# Patient Record
Sex: FEMALE | Race: BLACK OR AFRICAN AMERICAN | NOT HISPANIC OR LATINO | Employment: FULL TIME | URBAN - METROPOLITAN AREA
[De-identification: names, ages, dates, MRNs, and addresses within clinical notes are randomized per-mention and may not be internally consistent; named-entity substitution may affect disease eponyms.]

---

## 2018-07-18 ENCOUNTER — HISTORICAL (OUTPATIENT)
Dept: LAB | Facility: HOSPITAL | Age: 53
End: 2018-07-18

## 2018-08-20 LAB — FINAL CULTURE: NORMAL

## 2019-07-19 ENCOUNTER — HISTORICAL (OUTPATIENT)
Dept: LAB | Facility: HOSPITAL | Age: 54
End: 2019-07-19

## 2021-08-05 ENCOUNTER — HISTORICAL (OUTPATIENT)
Dept: ADMINISTRATIVE | Facility: HOSPITAL | Age: 56
End: 2021-08-05

## 2022-02-21 LAB
HPV16+18+H RISK 12 DNA CVX-IMP: NEGATIVE
PAP RECOMMENDATION EXT: NORMAL
PAP SMEAR: NORMAL

## 2022-03-14 ENCOUNTER — HISTORICAL (OUTPATIENT)
Dept: RADIOLOGY | Facility: HOSPITAL | Age: 57
End: 2022-03-14

## 2022-03-14 ENCOUNTER — HISTORICAL (OUTPATIENT)
Dept: ADMINISTRATIVE | Facility: HOSPITAL | Age: 57
End: 2022-03-14

## 2022-04-09 ENCOUNTER — HISTORICAL (OUTPATIENT)
Dept: ADMINISTRATIVE | Facility: HOSPITAL | Age: 57
End: 2022-04-09
Payer: COMMERCIAL

## 2022-04-25 VITALS
OXYGEN SATURATION: 98 % | DIASTOLIC BLOOD PRESSURE: 78 MMHG | HEIGHT: 65 IN | BODY MASS INDEX: 27.56 KG/M2 | SYSTOLIC BLOOD PRESSURE: 127 MMHG | WEIGHT: 165.44 LBS

## 2022-08-15 ENCOUNTER — TELEPHONE (OUTPATIENT)
Dept: FAMILY MEDICINE | Facility: CLINIC | Age: 57
End: 2022-08-15
Payer: COMMERCIAL

## 2022-08-15 DIAGNOSIS — E55.9 VITAMIN D DEFICIENCY: ICD-10-CM

## 2022-08-15 DIAGNOSIS — Z00.00 WELLNESS EXAMINATION: Primary | ICD-10-CM

## 2022-08-15 NOTE — TELEPHONE ENCOUNTER
----- Message from Kranthi Trevino MA sent at 8/15/2022  8:42 AM CDT -----  Regarding: FW: Lab orders  Can wellness labs be ordered  ----- Message -----  From: Alyssia López  Sent: 8/15/2022   8:38 AM CDT  To: Ana SKELTON Staff  Subject: Lab orders                                       .Type:  Needs Medical Advice    Who Called: Pt  Symptoms (please be specific):    How long has patient had these symptoms:    Pharmacy name and phone #:    Would the patient rather a call back or a response via [facility]? Call back  Best Call Back Number: [phone]  Additional Information: Needing lab orders, pls call pt once in..

## 2022-08-16 ENCOUNTER — LAB VISIT (OUTPATIENT)
Dept: LAB | Facility: HOSPITAL | Age: 57
End: 2022-08-16
Attending: FAMILY MEDICINE
Payer: COMMERCIAL

## 2022-08-16 DIAGNOSIS — E55.9 VITAMIN D DEFICIENCY: ICD-10-CM

## 2022-08-16 DIAGNOSIS — Z00.00 WELLNESS EXAMINATION: ICD-10-CM

## 2022-08-16 LAB
ALBUMIN SERPL-MCNC: 3.9 GM/DL (ref 3.5–5)
ALBUMIN/GLOB SERPL: 1.5 RATIO (ref 1.1–2)
ALP SERPL-CCNC: 74 UNIT/L (ref 40–150)
ALT SERPL-CCNC: 12 UNIT/L (ref 0–55)
APPEARANCE UR: CLEAR
AST SERPL-CCNC: 15 UNIT/L (ref 5–34)
BACTERIA #/AREA URNS AUTO: NORMAL /HPF
BASOPHILS # BLD AUTO: 0.02 X10(3)/MCL (ref 0–0.2)
BASOPHILS NFR BLD AUTO: 0.4 %
BILIRUB UR QL STRIP.AUTO: NEGATIVE MG/DL
BILIRUBIN DIRECT+TOT PNL SERPL-MCNC: 0.6 MG/DL
BUN SERPL-MCNC: 10 MG/DL (ref 9.8–20.1)
CALCIUM SERPL-MCNC: 9 MG/DL (ref 8.4–10.2)
CHLORIDE SERPL-SCNC: 110 MMOL/L (ref 98–107)
CHOLEST SERPL-MCNC: 178 MG/DL
CHOLEST/HDLC SERPL: 4 {RATIO} (ref 0–5)
CO2 SERPL-SCNC: 27 MMOL/L (ref 22–29)
COLOR UR AUTO: YELLOW
CREAT SERPL-MCNC: 0.88 MG/DL (ref 0.55–1.02)
DEPRECATED CALCIDIOL+CALCIFEROL SERPL-MC: 72.9 NG/ML (ref 30–80)
EOSINOPHIL # BLD AUTO: 0.06 X10(3)/MCL (ref 0–0.9)
EOSINOPHIL NFR BLD AUTO: 1.1 %
ERYTHROCYTE [DISTWIDTH] IN BLOOD BY AUTOMATED COUNT: 14.5 % (ref 11.5–17)
EST. AVERAGE GLUCOSE BLD GHB EST-MCNC: 116.9 MG/DL
GFR SERPLBLD CREATININE-BSD FMLA CKD-EPI: >60 MLS/MIN/1.73/M2
GLOBULIN SER-MCNC: 2.6 GM/DL (ref 2.4–3.5)
GLUCOSE SERPL-MCNC: 94 MG/DL (ref 74–100)
GLUCOSE UR QL STRIP.AUTO: NEGATIVE MG/DL
HBA1C MFR BLD: 5.7 %
HCT VFR BLD AUTO: 36.8 % (ref 37–47)
HDLC SERPL-MCNC: 48 MG/DL (ref 35–60)
HGB BLD-MCNC: 12.1 GM/DL (ref 12–16)
IMM GRANULOCYTES # BLD AUTO: 0.01 X10(3)/MCL (ref 0–0.04)
IMM GRANULOCYTES NFR BLD AUTO: 0.2 %
KETONES UR QL STRIP.AUTO: NEGATIVE MG/DL
LDLC SERPL CALC-MCNC: 119 MG/DL (ref 50–140)
LEUKOCYTE ESTERASE UR QL STRIP.AUTO: NEGATIVE UNIT/L
LYMPHOCYTES # BLD AUTO: 2.12 X10(3)/MCL (ref 0.6–4.6)
LYMPHOCYTES NFR BLD AUTO: 40.5 %
MCH RBC QN AUTO: 27.3 PG (ref 27–31)
MCHC RBC AUTO-ENTMCNC: 32.9 MG/DL (ref 33–36)
MCV RBC AUTO: 82.9 FL (ref 80–94)
MONOCYTES # BLD AUTO: 0.6 X10(3)/MCL (ref 0.1–1.3)
MONOCYTES NFR BLD AUTO: 11.5 %
NEUTROPHILS # BLD AUTO: 2.4 X10(3)/MCL (ref 2.1–9.2)
NEUTROPHILS NFR BLD AUTO: 46.3 %
NITRITE UR QL STRIP.AUTO: NEGATIVE
NRBC BLD AUTO-RTO: 0 %
PH UR STRIP.AUTO: 6 [PH]
PLATELET # BLD AUTO: 235 X10(3)/MCL (ref 130–400)
PMV BLD AUTO: 10.4 FL (ref 7.4–10.4)
POTASSIUM SERPL-SCNC: 4.5 MMOL/L (ref 3.5–5.1)
PROT SERPL-MCNC: 6.5 GM/DL (ref 6.4–8.3)
PROT UR QL STRIP.AUTO: NEGATIVE MG/DL
RBC # BLD AUTO: 4.44 X10(6)/MCL (ref 4.2–5.4)
RBC #/AREA URNS AUTO: <5 /HPF
RBC UR QL AUTO: NEGATIVE UNIT/L
SODIUM SERPL-SCNC: 144 MMOL/L (ref 136–145)
SP GR UR STRIP.AUTO: 1.01 (ref 1–1.03)
SQUAMOUS #/AREA URNS AUTO: <5 /HPF
TRIGL SERPL-MCNC: 57 MG/DL (ref 37–140)
TSH SERPL-ACNC: 1.93 UIU/ML (ref 0.35–4.94)
UROBILINOGEN UR STRIP-ACNC: 0.2 MG/DL
VLDLC SERPL CALC-MCNC: 11 MG/DL
WBC # SPEC AUTO: 5.2 X10(3)/MCL (ref 4.5–11.5)
WBC #/AREA URNS AUTO: <5 /HPF

## 2022-08-16 PROCEDURE — 85025 COMPLETE CBC W/AUTO DIFF WBC: CPT

## 2022-08-16 PROCEDURE — 82306 VITAMIN D 25 HYDROXY: CPT

## 2022-08-16 PROCEDURE — 36415 COLL VENOUS BLD VENIPUNCTURE: CPT

## 2022-08-16 PROCEDURE — 80061 LIPID PANEL: CPT

## 2022-08-16 PROCEDURE — 80053 COMPREHEN METABOLIC PANEL: CPT

## 2022-08-16 PROCEDURE — 83036 HEMOGLOBIN GLYCOSYLATED A1C: CPT

## 2022-08-16 PROCEDURE — 81001 URINALYSIS AUTO W/SCOPE: CPT

## 2022-08-16 PROCEDURE — 84443 ASSAY THYROID STIM HORMONE: CPT

## 2022-08-17 RX ORDER — ALENDRONATE SODIUM 70 MG/1
1 TABLET ORAL
COMMUNITY
Start: 2022-06-06

## 2022-08-23 ENCOUNTER — OFFICE VISIT (OUTPATIENT)
Dept: FAMILY MEDICINE | Facility: CLINIC | Age: 57
End: 2022-08-23
Payer: COMMERCIAL

## 2022-08-23 VITALS
DIASTOLIC BLOOD PRESSURE: 74 MMHG | HEIGHT: 64 IN | OXYGEN SATURATION: 98 % | TEMPERATURE: 98 F | HEART RATE: 74 BPM | BODY MASS INDEX: 27.49 KG/M2 | RESPIRATION RATE: 18 BRPM | SYSTOLIC BLOOD PRESSURE: 118 MMHG | WEIGHT: 161 LBS

## 2022-08-23 DIAGNOSIS — Z00.00 WELLNESS EXAMINATION: Primary | ICD-10-CM

## 2022-08-23 DIAGNOSIS — R73.03 PREDIABETES: ICD-10-CM

## 2022-08-23 DIAGNOSIS — Z12.31 VISIT FOR SCREENING MAMMOGRAM: ICD-10-CM

## 2022-08-23 PROCEDURE — 99396 PR PREVENTIVE VISIT,EST,40-64: ICD-10-PCS | Mod: ,,, | Performed by: FAMILY MEDICINE

## 2022-08-23 PROCEDURE — 1159F MED LIST DOCD IN RCRD: CPT | Mod: CPTII,,, | Performed by: FAMILY MEDICINE

## 2022-08-23 PROCEDURE — 3074F SYST BP LT 130 MM HG: CPT | Mod: CPTII,,, | Performed by: FAMILY MEDICINE

## 2022-08-23 PROCEDURE — 1160F PR REVIEW ALL MEDS BY PRESCRIBER/CLIN PHARMACIST DOCUMENTED: ICD-10-PCS | Mod: CPTII,,, | Performed by: FAMILY MEDICINE

## 2022-08-23 PROCEDURE — 3078F DIAST BP <80 MM HG: CPT | Mod: CPTII,,, | Performed by: FAMILY MEDICINE

## 2022-08-23 PROCEDURE — 3078F PR MOST RECENT DIASTOLIC BLOOD PRESSURE < 80 MM HG: ICD-10-PCS | Mod: CPTII,,, | Performed by: FAMILY MEDICINE

## 2022-08-23 PROCEDURE — 3008F BODY MASS INDEX DOCD: CPT | Mod: CPTII,,, | Performed by: FAMILY MEDICINE

## 2022-08-23 PROCEDURE — 1160F RVW MEDS BY RX/DR IN RCRD: CPT | Mod: CPTII,,, | Performed by: FAMILY MEDICINE

## 2022-08-23 PROCEDURE — 3074F PR MOST RECENT SYSTOLIC BLOOD PRESSURE < 130 MM HG: ICD-10-PCS | Mod: CPTII,,, | Performed by: FAMILY MEDICINE

## 2022-08-23 PROCEDURE — 99396 PREV VISIT EST AGE 40-64: CPT | Mod: ,,, | Performed by: FAMILY MEDICINE

## 2022-08-23 PROCEDURE — 3008F PR BODY MASS INDEX (BMI) DOCUMENTED: ICD-10-PCS | Mod: CPTII,,, | Performed by: FAMILY MEDICINE

## 2022-08-23 PROCEDURE — 1159F PR MEDICATION LIST DOCUMENTED IN MEDICAL RECORD: ICD-10-PCS | Mod: CPTII,,, | Performed by: FAMILY MEDICINE

## 2022-08-23 NOTE — PROGRESS NOTES
Patient ID: [id number]     Chief Complaint: Annual Exam        HPI:     Krystina Bellamy is a 56 y.o. female here today for annual wellness exam.   Well Adult History   The patient presents for well adult exam. The patient's general health status is described as good. The patient's diet is described as balanced. Exercise: occasional. Associated symptoms consist of denies weight loss, denies weight gain, denies fatigue, denies headache, denies hearing loss and denies vision changes. Last menstrual period: Menopause since 46 years old, last DEXA scan: 03/14/2022, osteopenia, on treatment, no side effects, due for repeat DEXA in 03/2024. Additional pertinent history: last dental exam: > 6 months (she have dental insurance, but she has an appointment scheduled for 12/02/2022), last eye exam: 02/2022 (history of macular degeneration, wears eyeglasses, followed by Ophthalmologist- Dr. Negron), last MMG was 03/14/2022, WNL at [facility]), Last pap smear: 02/16/2022, WNL with negative HPV, Last Colonoscopy: 10/16/2014 at [facility], WNL, seat belt use, occasional caffeine use (tea), tobacco use none, no alcohol use. She had labs done on 08/16/2022, here to discuss the results today. She is UTD on vaccines, she has never had chickenpox as a child and doesn't need Shingrix. She refuses COVID-19 vaccine today.  - Patient is without any other complaints today.       ----------------------------  GERD (gastroesophageal reflux disease)  Hyperkalemia  Osteopenia  Vitamin D deficiency     Past Surgical History:   Procedure Laterality Date    TONSILLECTOMY      TUBAL LIGATION         Review of patient's allergies indicates:   Allergen Reactions    Oxycodone-acetaminophen Other (See Comments)     HALUCINATES  Other reaction(s): rash       Outpatient Medications Marked as Taking for the 8/23/22 encounter (Office Visit) with Nohemy Perez MD   Medication Sig Dispense Refill    alendronate (FOSAMAX) 70 MG tablet Take 1 tablet by mouth  "every 7 days.         Social History     Socioeconomic History    Marital status:    Tobacco Use    Smoking status: Never Smoker    Smokeless tobacco: Never Used   Substance and Sexual Activity    Alcohol use: Yes     Comment: SOCIALLY    Drug use: No    Sexual activity: Yes     Partners: Male     Birth control/protection: Surgical        Family History   Problem Relation Age of Onset    Heart failure Mother     Hypertension Mother     Kidney failure Mother     Heart attack Father         Subjective:       Review of Systems:    See HPI for details    Constitutional: No fever, No chills, No fatigue.   Eye: Negative except for HPI; No blurring, No visual disturbances.   Ear/Nose/Mouth/Throat: No decreased hearing, No ear pain, No nasal congestion, No sore throat.   Respiratory: No shortness of breath, No cough, No wheezing.   Cardiovascular: No chest pain, No palpitations, No peripheral edema.   Breast: Both breasts, No lump/ mass, No pain.   Nipple discharge: None.   Gastrointestinal: No nausea, No vomiting, No acid reflux, No diarrhea, No constipation, No abdominal pain.   Genitourinary: No dysuria, No hematuria.   Gynecologic: Negative except as documented in history of present illness.   Hematology/Lymphatics: No bruising tendency, No bleeding tendency, No swollen lymph glands.   Endocrine: No excessive thirst, No polyuria, No excessive hunger.   Musculoskeletal: No joint pain, No muscle pain, No decreased range of motion.   Integumentary: No rash, No pruritus.   Neurologic: No abnormal balance, No confusion, No headache.   Psychiatric: No anxiety, No depression, Not suicidal, No hallucinations.       All Other ROS: Negative except as stated in HPI.       Objective:     /74 (BP Location: Right arm, Patient Position: Sitting, BP Method: Medium (Manual))   Pulse 74   Temp 98.2 °F (36.8 °C) (Oral)   Resp 18   Ht 5' 4" (1.626 m)   Wt 73 kg (161 lb)   SpO2 98%   BMI 27.64 kg/m² "     Physical Exam    General: Alert and oriented, No acute distress.   Appearance: Overweight.   Eye: Pupils are equal, round and reactive to light, Normal conjunctiva.   HENT: Normocephalic, Tympanic membranes are clear, Normal hearing, Oral mucosa is moist, No pharyngeal erythema.   Throat: Pharynx ( Not edematous, No exudate ).   Neck: Supple, Non-tender, No carotid bruit, No lymphadenopathy, No thyromegaly.   Respiratory: Lungs are clear to auscultation, Respirations are non-labored, Breath sounds are equal, Symmetrical chest wall expansion, No chest wall tenderness.   Cardiovascular: Normal rate, Regular rhythm, No murmur, Good pulses equal in all extremities, No edema.   Gastrointestinal: Soft, Non-tender, Non-distended, Normal bowel sounds, No organomegaly, No rebound, No guarding, Negative Espinosa's.   Genitourinary: No costovertebral angle tenderness.   Musculoskeletal: Normal range of motion, No tenderness, Normal gait.   Integumentary: No rashes, No lesions.  Neurologic: No focal deficits, Cranial Nerves II-XII are grossly intact.   Psychiatric: Cooperative, Appropriate mood & affect, Normal judgment, Non-suicidal.   Mood and affect: Calm.   Behavior: Relaxed.     *Lab results from 08/16/2022 were reviewed and discussed with patient and patient voices understanding.*          Assessment:       ICD-10-CM ICD-9-CM   1. Wellness examination  Z00.00 V70.0   2. Prediabetes  R73.03 790.29   3. Visit for screening mammogram  Z12.31 V76.12        Plan:     Problem List Items Addressed This Visit    None     Visit Diagnoses     Wellness examination    -  Primary    Prediabetes        Relevant Orders    Comprehensive Metabolic Panel    Hemoglobin A1C    Visit for screening mammogram        Relevant Orders    Mammo Digital Screening Bilat w/ Delroy       1. Wellness examination  - Monthly breast self exam encouraged. Diet, exercise, and 10% weight loss encouraged. Keep appointment for dental exams x q6 months as  scheduled. Keep appointment for annual eye exam as scheduled. Pap smear in 2025 or sooner if indicated. Notify M.D. or ER if temp greater than 100.4, or any acute illness.   Body mass index is 27.64 kg/m².  Goal BMI <30.  Exercise 5 times a week for 30 minutes per day.  Avoid soda, simple sugars, excessive rice, potatoes or bread. Limit fast foods and fried foods.  Choose complex carbs in moderation (example: green vegetables, beans, oatmeal). Eat plenty of fresh fruits and vegetables with lean meats daily.  Do not skip meals. Eat a balanced portion size.  Avoid fad diets. Consider permanent healthy life style changes.     2. Prediabetes  - Comprehensive Metabolic Panel; Future in 02/2023.  - Hemoglobin A1C; Future in 02/2023.   Lab Results   Component Value Date    HGBA1C 5.7 08/16/2022      Continue   Follow ADA Diet. Avoid soda, simple sweets, and limit rice/pasta/breads/starches.  Maintain healthy weight with goal BMI <25.  Exercise 5 times per week for 30 minutes per day.    3. Visit for screening mammogram  - Mammo Digital Screening Bilat w/ [facility]; Future        Krystina was seen today for annual exam.    Diagnoses and all orders for this visit:    Wellness examination    Prediabetes  -     Comprehensive Metabolic Panel; Future  -     Hemoglobin A1C; Future    Visit for screening mammogram  -     Mammo Digital Screening Bilat w/ [facility]roy; Future          Medication List with Changes/Refills   Current Medications    ALENDRONATE (FOSAMAX) 70 MG TABLET    Take 1 tablet by mouth every 7 days.       Start Date: 6/6/2022  End Date: --          Follow up in about 6 months (around 2/23/2023) for Pre-Diabetes Followup.

## 2023-02-27 ENCOUNTER — LAB VISIT (OUTPATIENT)
Dept: LAB | Facility: HOSPITAL | Age: 58
End: 2023-02-27
Attending: FAMILY MEDICINE
Payer: COMMERCIAL

## 2023-02-27 DIAGNOSIS — R73.03 PREDIABETES: ICD-10-CM

## 2023-02-27 LAB
ALBUMIN SERPL-MCNC: 3.9 G/DL (ref 3.5–5)
ALBUMIN/GLOB SERPL: 1.5 RATIO (ref 1.1–2)
ALP SERPL-CCNC: 97 UNIT/L (ref 40–150)
ALT SERPL-CCNC: 18 UNIT/L (ref 0–55)
AST SERPL-CCNC: 16 UNIT/L (ref 5–34)
BILIRUBIN DIRECT+TOT PNL SERPL-MCNC: 0.4 MG/DL
BUN SERPL-MCNC: 10.5 MG/DL (ref 9.8–20.1)
CALCIUM SERPL-MCNC: 9.3 MG/DL (ref 8.4–10.2)
CHLORIDE SERPL-SCNC: 109 MMOL/L (ref 98–107)
CO2 SERPL-SCNC: 30 MMOL/L (ref 22–29)
CREAT SERPL-MCNC: 1.07 MG/DL (ref 0.55–1.02)
EST. AVERAGE GLUCOSE BLD GHB EST-MCNC: 108.3 MG/DL
GFR SERPLBLD CREATININE-BSD FMLA CKD-EPI: 60 MLS/MIN/1.73/M2
GLOBULIN SER-MCNC: 2.6 GM/DL (ref 2.4–3.5)
GLUCOSE SERPL-MCNC: 103 MG/DL (ref 74–100)
HBA1C MFR BLD: 5.4 %
POTASSIUM SERPL-SCNC: 5 MMOL/L (ref 3.5–5.1)
PROT SERPL-MCNC: 6.5 GM/DL (ref 6.4–8.3)
SODIUM SERPL-SCNC: 145 MMOL/L (ref 136–145)

## 2023-02-27 PROCEDURE — 83036 HEMOGLOBIN GLYCOSYLATED A1C: CPT

## 2023-02-27 PROCEDURE — 80053 COMPREHEN METABOLIC PANEL: CPT

## 2023-02-27 PROCEDURE — 36415 COLL VENOUS BLD VENIPUNCTURE: CPT

## 2023-02-28 ENCOUNTER — OFFICE VISIT (OUTPATIENT)
Dept: FAMILY MEDICINE | Facility: CLINIC | Age: 58
End: 2023-02-28
Payer: COMMERCIAL

## 2023-02-28 VITALS
BODY MASS INDEX: 26.71 KG/M2 | SYSTOLIC BLOOD PRESSURE: 122 MMHG | DIASTOLIC BLOOD PRESSURE: 78 MMHG | WEIGHT: 155.63 LBS | OXYGEN SATURATION: 99 % | RESPIRATION RATE: 16 BRPM | HEART RATE: 74 BPM

## 2023-02-28 DIAGNOSIS — R73.03 PREDIABETES: Primary | ICD-10-CM

## 2023-02-28 DIAGNOSIS — M25.561 CHRONIC PAIN OF RIGHT KNEE: ICD-10-CM

## 2023-02-28 DIAGNOSIS — G89.29 CHRONIC PAIN OF RIGHT KNEE: ICD-10-CM

## 2023-02-28 PROCEDURE — 1159F PR MEDICATION LIST DOCUMENTED IN MEDICAL RECORD: ICD-10-PCS | Mod: CPTII,,, | Performed by: FAMILY MEDICINE

## 2023-02-28 PROCEDURE — 99214 PR OFFICE/OUTPT VISIT, EST, LEVL IV, 30-39 MIN: ICD-10-PCS | Mod: ,,, | Performed by: FAMILY MEDICINE

## 2023-02-28 PROCEDURE — 3074F SYST BP LT 130 MM HG: CPT | Mod: CPTII,,, | Performed by: FAMILY MEDICINE

## 2023-02-28 PROCEDURE — 3008F BODY MASS INDEX DOCD: CPT | Mod: CPTII,,, | Performed by: FAMILY MEDICINE

## 2023-02-28 PROCEDURE — 3074F PR MOST RECENT SYSTOLIC BLOOD PRESSURE < 130 MM HG: ICD-10-PCS | Mod: CPTII,,, | Performed by: FAMILY MEDICINE

## 2023-02-28 PROCEDURE — 3078F PR MOST RECENT DIASTOLIC BLOOD PRESSURE < 80 MM HG: ICD-10-PCS | Mod: CPTII,,, | Performed by: FAMILY MEDICINE

## 2023-02-28 PROCEDURE — 1159F MED LIST DOCD IN RCRD: CPT | Mod: CPTII,,, | Performed by: FAMILY MEDICINE

## 2023-02-28 PROCEDURE — 1160F PR REVIEW ALL MEDS BY PRESCRIBER/CLIN PHARMACIST DOCUMENTED: ICD-10-PCS | Mod: CPTII,,, | Performed by: FAMILY MEDICINE

## 2023-02-28 PROCEDURE — 99214 OFFICE O/P EST MOD 30 MIN: CPT | Mod: ,,, | Performed by: FAMILY MEDICINE

## 2023-02-28 PROCEDURE — 3078F DIAST BP <80 MM HG: CPT | Mod: CPTII,,, | Performed by: FAMILY MEDICINE

## 2023-02-28 PROCEDURE — 3008F PR BODY MASS INDEX (BMI) DOCUMENTED: ICD-10-PCS | Mod: CPTII,,, | Performed by: FAMILY MEDICINE

## 2023-02-28 PROCEDURE — 1160F RVW MEDS BY RX/DR IN RCRD: CPT | Mod: CPTII,,, | Performed by: FAMILY MEDICINE

## 2023-02-28 RX ORDER — DICLOFENAC SODIUM 10 MG/G
2 GEL TOPICAL 4 TIMES DAILY PRN
Qty: 100 G | Refills: 2 | Status: SHIPPED | OUTPATIENT
Start: 2023-02-28

## 2023-02-28 NOTE — PROGRESS NOTES
Patient ID: [id number]     Chief Complaint: Follow-up (Pre-DM)        HPI:     Krystina Bellamy is a 57 y.o. female here today for a follow up pre-diabetes.   - Pre-diabetes is stable and asymptomatic, she reports compliance with lifestyle modifications, lost 6 pounds since last visit with diet and exercise. She had labs done on 02/27/2023, here to discuss the results today.   - Patient complains of right knee pain x 1 year, she denies injury, she reports that the pain is intermittent, 5/10 on pain scale, sharp, worse with walking, she denies swelling or erythema, she reports popping. She hasn't tried any Rx or had any imaging.   - Patient is without any other complaints today.         ----------------------------  GERD (gastroesophageal reflux disease)  Hyperkalemia  Osteopenia  Vitamin D deficiency     Past Surgical History:   Procedure Laterality Date    TONSILLECTOMY      TUBAL LIGATION         Review of patient's allergies indicates:   Allergen Reactions    Oxycodone-acetaminophen Other (See Comments)     HALUCINATES  Other reaction(s): rash       Outpatient Medications Marked as Taking for the 2/28/23 encounter (Office Visit) with Nohemy Perez MD   Medication Sig Dispense Refill    alendronate (FOSAMAX) 70 MG tablet Take 1 tablet by mouth every 7 days.         Social History     Socioeconomic History    Marital status:    Tobacco Use    Smoking status: Never    Smokeless tobacco: Never   Substance and Sexual Activity    Alcohol use: Yes     Comment: SOCIALLY    Drug use: No    Sexual activity: Yes     Partners: Male     Birth control/protection: Surgical        Family History   Problem Relation Age of Onset    Heart failure Mother     Hypertension Mother     Kidney failure Mother     Heart attack Father         Subjective:       Review of Systems:    See HPI for details    Constitutional: Denies Change in appetite. Denies Chills. Denies Fever. Denies Night sweats.  Eye: Denies Blurred vision.  Denies Discharge. Denies Eye pain.  ENT: Denies Decreased hearing. Denies Sore throat. Denies Swollen glands.  Respiratory: Denies Cough. Denies Shortness of breath. Denies Shortness of breath with exertion. Denies Wheezing.  Cardiovascular: Denies Chest pain at rest. Denies Chest pain with exertion. Denies Irregular heartbeat. Denies Palpitations.  Gastrointestinal: Denies Abdominal pain. Denies Diarrhea. Denies Nausea. Denies Vomiting. Denies Hematemesis or Hematochezia.  Genitourinary: Denies Dysuria. Denies Urinary frequency. Denies Urinary urgency. Denies Blood in urine.  Endocrine: Denies Cold intolerance. Denies Excessive thirst. Denies Heat intolerance. Denies Weight loss. Denies Weight gain.  Musculoskeletal: Reports Painful joints. Denies Weakness.  Integumentary: Denies Rash. Denies Itching. Denies Dry skin.  Neurologic: Denies Dizziness. Denies Fainting. Denies Headache.  Psychiatric: Denies Depression. Denies Anxiety. Denies Suicidal/Homicidal ideations.    All Other ROS: Negative except as stated in HPI.       Objective:     /78 (BP Location: Right arm, Patient Position: Sitting, BP Method: Medium (Manual))   Pulse 74   Resp 16   Wt 70.6 kg (155 lb 9.6 oz)   SpO2 99%   BMI 26.71 kg/m²     Physical Exam    General: Alert and oriented, No acute distress.  Head: Normocephalic, Atraumatic.  Eye: Pupils are equal, round and reactive to light, Extraocular movements are intact, Sclera non-icteric.  Ears/Nose/Throat: Normal, Mucosa moist,Clear.  Neck/Thyroid: Supple, Non-tender, No carotid bruit, No palpable thyromegaly or thyroid nodule, No lymphadenopathy, No JVD, Full range of motion.  Respiratory: Clear to auscultation bilaterally; No wheezes, rales or rhonchi,Non-labored respirations, Symmetrical chest wall expansion.  Cardiovascular: Regular rate and rhythm, S1/S2 normal, No murmurs, rubs or gallops.  Gastrointestinal: Soft, Non-tender, Non-distended, Normal bowel sounds, No palpable  organomegaly.  Musculoskeletal: Normal range of motion.  Integumentary: Warm, Dry, Intact, No suspicious lesions or rashes.  Extremities: No clubbing, cyanosis or edema. Right knee with FROM, NT, no erythema, no effusion, no deformity, positive crepitus, negative Lachman's, negative Bereket's.  Neurologic: No focal deficits, Cranial Nerves II-XII are grossly intact, Motor strength normal upper and lower extremities, Sensory exam intact.  Psychiatric: Normal interaction, Coherent speech, Euthymic mood, Appropriate affect     *Lab results from 02/27/2023 and XR right knee from 06/06/2014 were reviewed and discussed with patient and patient voices understanding.*    Assessment:       ICD-10-CM ICD-9-CM   1. Prediabetes  R73.03 790.29   2. Chronic pain of right knee  M25.561 719.46    G89.29 338.29        Plan:     Problem List Items Addressed This Visit    None  Visit Diagnoses       Prediabetes    -  Primary    Chronic pain of right knee        Relevant Medications    diclofenac sodium (VOLTAREN) 1 % Gel    Other Relevant Orders    X-Ray Knee 1 or 2 View Right         1. Prediabetes  - Resolved, continue lifestyle modifications for  prevention of diabetes. Recheck HgA1C with annual wellness labs.  Lab Results   Component Value Date    HGBA1C 5.4 02/27/2023      Continue   Follow ADA Diet. Avoid soda, simple sweets, and limit rice/pasta/breads/starches.  Maintain healthy weight with goal BMI <30.  Exercise 5 times per week for 30 minutes per day.    2. Chronic pain of right knee  - diclofenac sodium (VOLTAREN) 1 % Gel; Apply 2 g topically 4 (four) times daily as needed (pain).  Dispense: 100 g; Refill: 2  - X-Ray Knee 1 or 2 View Right; Future  - Rx trial of Voltaren Gel. Will followup Xray results, if Xray is normal, will proceed with PT referral. Stretching exercises encouraged. Notify M.D. or ER if symptoms persist or worsen, temp >100.4, or any acute illness.        Krystina was seen today for  follow-up.    Diagnoses and all orders for this visit:    Prediabetes    Chronic pain of right knee  -     diclofenac sodium (VOLTAREN) 1 % Gel; Apply 2 g topically 4 (four) times daily as needed (pain).  -     X-Ray Knee 1 or 2 View Right; Future          Medication List with Changes/Refills   New Medications    DICLOFENAC SODIUM (VOLTAREN) 1 % GEL    Apply 2 g topically 4 (four) times daily as needed (pain).       Start Date: 2/28/2023 End Date: --   Current Medications    ALENDRONATE (FOSAMAX) 70 MG TABLET    Take 1 tablet by mouth every 7 days.       Start Date: 6/6/2022  End Date: --          Follow up in about 6 months (around 8/28/2023) for Wellness.

## 2023-03-02 ENCOUNTER — HOSPITAL ENCOUNTER (OUTPATIENT)
Dept: RADIOLOGY | Facility: HOSPITAL | Age: 58
Discharge: HOME | End: 2023-03-02
Attending: FAMILY MEDICINE
Payer: COMMERCIAL

## 2023-03-02 DIAGNOSIS — M25.561 CHRONIC PAIN OF RIGHT KNEE: ICD-10-CM

## 2023-03-02 DIAGNOSIS — G89.29 CHRONIC PAIN OF RIGHT KNEE: ICD-10-CM

## 2023-03-02 PROCEDURE — 73560 X-RAY EXAM OF KNEE 1 OR 2: CPT | Mod: TC,RT

## 2023-03-03 ENCOUNTER — TELEPHONE (OUTPATIENT)
Dept: FAMILY MEDICINE | Facility: CLINIC | Age: 58
End: 2023-03-03
Payer: COMMERCIAL

## 2023-03-03 NOTE — TELEPHONE ENCOUNTER
----- Message from Nohemy Perez MD sent at 3/2/2023 12:04 PM CST -----  XR right knee is normal, please advise if patient would like to proceed with physical therapy referral for evaluation and treatment.

## 2023-03-16 ENCOUNTER — HOSPITAL ENCOUNTER (OUTPATIENT)
Dept: RADIOLOGY | Facility: HOSPITAL | Age: 58
Discharge: HOME | End: 2023-03-16
Attending: FAMILY MEDICINE
Payer: COMMERCIAL

## 2023-03-16 ENCOUNTER — TELEPHONE (OUTPATIENT)
Dept: FAMILY MEDICINE | Facility: CLINIC | Age: 58
End: 2023-03-16
Payer: COMMERCIAL

## 2023-03-16 DIAGNOSIS — Z12.31 VISIT FOR SCREENING MAMMOGRAM: ICD-10-CM

## 2023-03-16 PROCEDURE — 77067 SCR MAMMO BI INCL CAD: CPT | Mod: 26,,, | Performed by: RADIOLOGY

## 2023-03-16 PROCEDURE — 77063 MAMMO DIGITAL SCREENING BILAT WITH TOMO: ICD-10-PCS | Mod: 26,,, | Performed by: RADIOLOGY

## 2023-03-16 PROCEDURE — 77067 SCR MAMMO BI INCL CAD: CPT | Mod: TC

## 2023-03-16 PROCEDURE — 77067 MAMMO DIGITAL SCREENING BILAT WITH TOMO: ICD-10-PCS | Mod: 26,,, | Performed by: RADIOLOGY

## 2023-03-16 PROCEDURE — 77063 BREAST TOMOSYNTHESIS BI: CPT | Mod: 26,,, | Performed by: RADIOLOGY

## 2023-03-16 NOTE — TELEPHONE ENCOUNTER
----- Message from Nohemy Perez MD sent at 3/16/2023  4:53 PM CDT -----  Mammogram is normal, routine mammogram in 03/2024.

## 2023-04-11 ENCOUNTER — DOCUMENTATION ONLY (OUTPATIENT)
Dept: ADMINISTRATIVE | Facility: HOSPITAL | Age: 58
End: 2023-04-11
Payer: COMMERCIAL

## 2023-06-06 ENCOUNTER — OFFICE VISIT (OUTPATIENT)
Dept: FAMILY MEDICINE | Facility: CLINIC | Age: 58
End: 2023-06-06
Payer: COMMERCIAL

## 2023-06-06 VITALS
SYSTOLIC BLOOD PRESSURE: 131 MMHG | OXYGEN SATURATION: 99 % | HEART RATE: 62 BPM | TEMPERATURE: 98 F | WEIGHT: 158 LBS | BODY MASS INDEX: 27.12 KG/M2 | DIASTOLIC BLOOD PRESSURE: 80 MMHG

## 2023-06-06 DIAGNOSIS — J06.9 URI WITH COUGH AND CONGESTION: Primary | ICD-10-CM

## 2023-06-06 DIAGNOSIS — J06.9 URI WITH COUGH AND CONGESTION: ICD-10-CM

## 2023-06-06 LAB
CTP QC/QA: YES
FLUAV AG NPH QL: NEGATIVE
FLUBV AG NPH QL: NEGATIVE
SARS-COV-2 RNA RESP QL NAA+PROBE: NOT DETECTED

## 2023-06-06 PROCEDURE — 1159F MED LIST DOCD IN RCRD: CPT | Mod: CPTII,,, | Performed by: FAMILY MEDICINE

## 2023-06-06 PROCEDURE — 99214 PR OFFICE/OUTPT VISIT, EST, LEVL IV, 30-39 MIN: ICD-10-PCS | Mod: ,,, | Performed by: FAMILY MEDICINE

## 2023-06-06 PROCEDURE — 3079F PR MOST RECENT DIASTOLIC BLOOD PRESSURE 80-89 MM HG: ICD-10-PCS | Mod: CPTII,,, | Performed by: FAMILY MEDICINE

## 2023-06-06 PROCEDURE — 3079F DIAST BP 80-89 MM HG: CPT | Mod: CPTII,,, | Performed by: FAMILY MEDICINE

## 2023-06-06 PROCEDURE — 99214 OFFICE O/P EST MOD 30 MIN: CPT | Mod: ,,, | Performed by: FAMILY MEDICINE

## 2023-06-06 PROCEDURE — 1160F PR REVIEW ALL MEDS BY PRESCRIBER/CLIN PHARMACIST DOCUMENTED: ICD-10-PCS | Mod: CPTII,,, | Performed by: FAMILY MEDICINE

## 2023-06-06 PROCEDURE — 3008F PR BODY MASS INDEX (BMI) DOCUMENTED: ICD-10-PCS | Mod: CPTII,,, | Performed by: FAMILY MEDICINE

## 2023-06-06 PROCEDURE — 3075F SYST BP GE 130 - 139MM HG: CPT | Mod: CPTII,,, | Performed by: FAMILY MEDICINE

## 2023-06-06 PROCEDURE — 1160F RVW MEDS BY RX/DR IN RCRD: CPT | Mod: CPTII,,, | Performed by: FAMILY MEDICINE

## 2023-06-06 PROCEDURE — 87635 SARS-COV-2 COVID-19 AMP PRB: CPT | Performed by: FAMILY MEDICINE

## 2023-06-06 PROCEDURE — 3008F BODY MASS INDEX DOCD: CPT | Mod: CPTII,,, | Performed by: FAMILY MEDICINE

## 2023-06-06 PROCEDURE — 87804 POCT INFLUENZA A/B: ICD-10-PCS | Mod: 59,QW,, | Performed by: FAMILY MEDICINE

## 2023-06-06 PROCEDURE — 3075F PR MOST RECENT SYSTOLIC BLOOD PRESS GE 130-139MM HG: ICD-10-PCS | Mod: CPTII,,, | Performed by: FAMILY MEDICINE

## 2023-06-06 PROCEDURE — 87804 INFLUENZA ASSAY W/OPTIC: CPT | Mod: 59,QW,, | Performed by: FAMILY MEDICINE

## 2023-06-06 PROCEDURE — 1159F PR MEDICATION LIST DOCUMENTED IN MEDICAL RECORD: ICD-10-PCS | Mod: CPTII,,, | Performed by: FAMILY MEDICINE

## 2023-06-06 RX ORDER — ALBUTEROL SULFATE 90 UG/1
2 AEROSOL, METERED RESPIRATORY (INHALATION) EVERY 6 HOURS PRN
Qty: 18 G | Refills: 0 | Status: SHIPPED | OUTPATIENT
Start: 2023-06-06 | End: 2023-06-06

## 2023-06-06 RX ORDER — ALBUTEROL SULFATE 90 UG/1
AEROSOL, METERED RESPIRATORY (INHALATION)
Qty: 18 G | Refills: 0 | Status: SHIPPED | OUTPATIENT
Start: 2023-06-06

## 2023-06-06 RX ORDER — PROMETHAZINE HYDROCHLORIDE AND DEXTROMETHORPHAN HYDROBROMIDE 6.25; 15 MG/5ML; MG/5ML
5 SYRUP ORAL EVERY 6 HOURS PRN
Qty: 120 ML | Refills: 0 | Status: SHIPPED | OUTPATIENT
Start: 2023-06-06

## 2023-06-06 NOTE — PROGRESS NOTES
Patient ID: [id number]     Chief Complaint: Cough (Times 5 days)        HPI:     Krystina Bellamy is a 57 y.o. female here today for cough x 5 days. She reports green sputum production. She reports postnasal drip. She denies fever, chills, sore throat, SOB, wheezing, or hemoptysis. She reports sick contacts at work. She hasn't done at home COVID-19 test. She has tried OTC Mucinex without resolution of symptoms.   - Patient is without any other complaints today.          ----------------------------  GERD (gastroesophageal reflux disease)  Hyperkalemia  Osteopenia  Vitamin D deficiency     Past Surgical History:   Procedure Laterality Date    TONSILLECTOMY      TUBAL LIGATION         Review of patient's allergies indicates:   Allergen Reactions    Oxycodone-acetaminophen Other (See Comments)     HALUCINATES  Other reaction(s): rash       Outpatient Medications Marked as Taking for the 6/6/23 encounter (Office Visit) with Nohemy Perez MD   Medication Sig Dispense Refill    alendronate (FOSAMAX) 70 MG tablet Take 1 tablet by mouth every 7 days.      diclofenac sodium (VOLTAREN) 1 % Gel Apply 2 g topically 4 (four) times daily as needed (pain). 100 g 2       Social History     Socioeconomic History    Marital status:    Tobacco Use    Smoking status: Never    Smokeless tobacco: Never   Substance and Sexual Activity    Alcohol use: Yes     Comment: SOCIALLY    Drug use: No    Sexual activity: Yes     Partners: Male     Birth control/protection: Surgical        Family History   Problem Relation Age of Onset    Heart failure Mother     Hypertension Mother     Kidney failure Mother     Heart attack Father         Subjective:       Review of Systems:    See HPI for details    Constitutional: Denies Change in appetite. Denies Chills. Denies Fever. Denies Night sweats.  Eye: Denies Blurred vision. Denies Discharge. Denies Eye pain.  ENT: Denies Decreased hearing. Denies Sore throat. Denies Swollen  glands.  Respiratory: Reports Cough. Denies Shortness of breath. Denies Shortness of breath with exertion. Denies Wheezing.  Cardiovascular: Denies Chest pain at rest. Denies Chest pain with exertion. Denies Irregular heartbeat. Denies Palpitations.  Gastrointestinal: Denies Abdominal pain. Denies Diarrhea. Denies Nausea. Denies Vomiting. Denies Hematemesis or Hematochezia.  Genitourinary: Denies Dysuria. Denies Urinary frequency. Denies Urinary urgency. Denies Blood in urine.  Endocrine: Denies Cold intolerance. Denies Excessive thirst. Denies Heat intolerance. Denies Weight loss. Denies Weight gain.  Musculoskeletal: Denies Painful joints. Denies Weakness.  Integumentary: Denies Rash. Denies Itching. Denies Dry skin.  Neurologic: Denies Dizziness. Denies Fainting. Denies Headache.  Psychiatric: Denies Depression. Denies Anxiety. Denies Suicidal/Homicidal ideations.    All Other ROS: Negative except as stated in HPI.       Objective:     /80 (BP Location: Right arm, Patient Position: Sitting, BP Method: Large (Automatic))   Pulse 62   Temp 97.9 °F (36.6 °C) (Oral)   Wt 71.7 kg (158 lb)   SpO2 99%   BMI 27.12 kg/m²     Physical Exam    General: Alert and oriented, No acute distress.  Head: Normocephalic, Atraumatic.  Eye: Pupils are equal, round and reactive to light, Extraocular movements are intact, Sclera non-icteric.  Ears/Nose/Throat: Normal, Mucosa moist,Clear.  Neck/Thyroid: Supple, Non-tender, No carotid bruit, No palpable thyromegaly or thyroid nodule, No lymphadenopathy, No JVD, Full range of motion.  Respiratory: Clear to auscultation bilaterally; No wheezes, rales or rhonchi,Non-labored respirations, Symmetrical chest wall expansion. Dry cough.   Cardiovascular: Regular rate and rhythm, S1/S2 normal, No murmurs, rubs or gallops.  Gastrointestinal: Soft, Non-tender, Non-distended, Normal bowel sounds, No palpable organomegaly.  Musculoskeletal: Normal range of motion.  Integumentary: Warm, Dry,  Intact, No suspicious lesions or rashes.  Extremities: No clubbing, cyanosis or edema  Neurologic: No focal deficits, Cranial Nerves II-XII are grossly intact, Motor strength normal upper and lower extremities, Sensory exam intact.  Psychiatric: Normal interaction, Coherent speech, Euthymic mood, Appropriate affect         Assessment:       ICD-10-CM ICD-9-CM   1. URI with cough and congestion  J06.9 465.9        Plan:     Problem List Items Addressed This Visit    None  Visit Diagnoses       URI with cough and congestion    -  Primary    Relevant Medications    promethazine-dextromethorphan (PROMETHAZINE-DM) 6.25-15 mg/5 mL Syrp    Other Relevant Orders    POCT Influenza A/B Rapid Antigen (Completed)    COVID-19 Routine Screening         1. URI with cough and congestion  - Rx trial of promethazine-dextromethorphan (PROMETHAZINE-DM) 6.25-15 mg/5 mL Syrp; Take 5 mLs by mouth every 6 (six) hours as needed (cough).  Dispense: 120 mL; Refill: 0  - Rx trial of albuterol (VENTOLIN HFA) 90 mcg/actuation inhaler; Inhale 2 puffs into the lungs every 6 (six) hours as needed for Wheezing. Rescue  Dispense: 18 g; Refill: 0  - POCT Influenza A/B Rapid Antigen  - COVID-19 Routine Screening; Future  - Will followup COVID-19 test results and patient agrees to self quarantine while awaiting results of COVID-19 testing. If COVID-19 testing is positive, will send Rx for Paxlovid. If COVID-19 testing is negative, will send Rx for Z-claritza. Increase fluid intake. If symptoms are refractory to treatment, will proceed with CXR. Notify M.D. or ER if symptoms persist or worsen, SOB, wheezing, hemoptysis, chest pain, temp >100.4, or any acute illness.        Krystina was seen today for cough.    Diagnoses and all orders for this visit:    URI with cough and congestion  -     promethazine-dextromethorphan (PROMETHAZINE-DM) 6.25-15 mg/5 mL Syrp; Take 5 mLs by mouth every 6 (six) hours as needed (cough).  -     Discontinue: albuterol (VENTOLIN HFA)  90 mcg/actuation inhaler; Inhale 2 puffs into the lungs every 6 (six) hours as needed for Wheezing. Rescue  -     POCT Influenza A/B Rapid Antigen  -     COVID-19 Routine Screening; Future  -     COVID-19 Routine Screening          Medication List with Changes/Refills   New Medications    PROAIR HFA 90 MCG/ACTUATION INHALER    INHALE 2 PUFFS BY MOUTH EVERY 6 HOURS AS NEEDED FOR WHEEZING       Start Date: 6/6/2023  End Date: --    PROMETHAZINE-DEXTROMETHORPHAN (PROMETHAZINE-DM) 6.25-15 MG/5 ML SYRP    Take 5 mLs by mouth every 6 (six) hours as needed (cough).       Start Date: 6/6/2023  End Date: --   Current Medications    ALENDRONATE (FOSAMAX) 70 MG TABLET    Take 1 tablet by mouth every 7 days.       Start Date: 6/6/2022  End Date: --    DICLOFENAC SODIUM (VOLTAREN) 1 % GEL    Apply 2 g topically 4 (four) times daily as needed (pain).       Start Date: 2/28/2023 End Date: --          Follow up if symptoms worsen or fail to improve.

## 2023-06-07 DIAGNOSIS — J06.9 URI WITH COUGH AND CONGESTION: Primary | ICD-10-CM

## 2023-06-07 RX ORDER — AZITHROMYCIN 250 MG/1
TABLET, FILM COATED ORAL
Qty: 6 TABLET | Refills: 0 | Status: SHIPPED | OUTPATIENT
Start: 2023-06-07 | End: 2023-06-12

## 2023-06-07 NOTE — TELEPHONE ENCOUNTER
----- Message from Nohemy Perez MD sent at 6/7/2023 12:51 PM CDT -----  COVID-19 testing is negative, Rx Z-claritza sent for URI.

## 2023-08-21 ENCOUNTER — TELEPHONE (OUTPATIENT)
Dept: FAMILY MEDICINE | Facility: CLINIC | Age: 58
End: 2023-08-21
Payer: COMMERCIAL

## 2023-08-21 DIAGNOSIS — Z00.00 WELLNESS EXAMINATION: Primary | ICD-10-CM

## 2023-08-21 NOTE — TELEPHONE ENCOUNTER
----- Message from April Stewart sent at 8/21/2023  9:03 AM CDT -----  Regarding: lab order  .Caller is requesting to schedule their Lab appointment prior to annual appointment.  Order is not listed in EPIC.  Please enter order and contact patient to schedule.    Name of Caller:patient    Preferred Date and Time of Labs: 8/22    Date of EPP Appointment: 8/24    Where would they like the lab performed?    Would the patient rather a call back or a response via [facility]? Call back    Best Call Back Number: [phone]    Additional Information: patient is requesting her labs be placed in epic for her wellness appointment on 8/24. Please advise.

## 2023-08-22 ENCOUNTER — LAB VISIT (OUTPATIENT)
Dept: LAB | Facility: HOSPITAL | Age: 58
End: 2023-08-22
Attending: FAMILY MEDICINE
Payer: COMMERCIAL

## 2023-08-22 DIAGNOSIS — Z00.00 WELLNESS EXAMINATION: ICD-10-CM

## 2023-08-22 LAB
ALBUMIN SERPL-MCNC: 4 G/DL (ref 3.5–5)
ALBUMIN/GLOB SERPL: 1.5 RATIO (ref 1.1–2)
ALP SERPL-CCNC: 110 UNIT/L (ref 40–150)
ALT SERPL-CCNC: 19 UNIT/L (ref 0–55)
APPEARANCE UR: CLEAR
AST SERPL-CCNC: 18 UNIT/L (ref 5–34)
BACTERIA #/AREA URNS AUTO: NORMAL /HPF
BASOPHILS # BLD AUTO: 0.03 X10(3)/MCL
BASOPHILS NFR BLD AUTO: 0.4 %
BILIRUB SERPL-MCNC: 0.4 MG/DL
BILIRUB UR QL STRIP.AUTO: NEGATIVE
BUN SERPL-MCNC: 15.3 MG/DL (ref 9.8–20.1)
CALCIUM SERPL-MCNC: 9.5 MG/DL (ref 8.4–10.2)
CHLORIDE SERPL-SCNC: 109 MMOL/L (ref 98–107)
CHOLEST SERPL-MCNC: 215 MG/DL
CHOLEST/HDLC SERPL: 4 {RATIO} (ref 0–5)
CO2 SERPL-SCNC: 27 MMOL/L (ref 22–29)
COLOR UR: YELLOW
CREAT SERPL-MCNC: 0.88 MG/DL (ref 0.55–1.02)
EOSINOPHIL # BLD AUTO: 0.1 X10(3)/MCL (ref 0–0.9)
EOSINOPHIL NFR BLD AUTO: 1.5 %
ERYTHROCYTE [DISTWIDTH] IN BLOOD BY AUTOMATED COUNT: 14.6 % (ref 11.5–17)
EST. AVERAGE GLUCOSE BLD GHB EST-MCNC: 111.2 MG/DL
GFR SERPLBLD CREATININE-BSD FMLA CKD-EPI: >60 MLS/MIN/1.73/M2
GLOBULIN SER-MCNC: 2.6 GM/DL (ref 2.4–3.5)
GLUCOSE SERPL-MCNC: 96 MG/DL (ref 74–100)
GLUCOSE UR QL STRIP.AUTO: NEGATIVE
HBA1C MFR BLD: 5.5 %
HCT VFR BLD AUTO: 37.2 % (ref 37–47)
HDLC SERPL-MCNC: 60 MG/DL (ref 35–60)
HGB BLD-MCNC: 12.5 G/DL (ref 12–16)
IMM GRANULOCYTES # BLD AUTO: 0.01 X10(3)/MCL (ref 0–0.04)
IMM GRANULOCYTES NFR BLD AUTO: 0.1 %
KETONES UR QL STRIP.AUTO: NEGATIVE
LDLC SERPL CALC-MCNC: 144 MG/DL (ref 50–140)
LEUKOCYTE ESTERASE UR QL STRIP.AUTO: ABNORMAL
LYMPHOCYTES # BLD AUTO: 2.53 X10(3)/MCL (ref 0.6–4.6)
LYMPHOCYTES NFR BLD AUTO: 37.1 %
MCH RBC QN AUTO: 27.6 PG (ref 27–31)
MCHC RBC AUTO-ENTMCNC: 33.6 G/DL (ref 33–36)
MCV RBC AUTO: 82.1 FL (ref 80–94)
MONOCYTES # BLD AUTO: 0.56 X10(3)/MCL (ref 0.1–1.3)
MONOCYTES NFR BLD AUTO: 8.2 %
NEUTROPHILS # BLD AUTO: 3.59 X10(3)/MCL (ref 2.1–9.2)
NEUTROPHILS NFR BLD AUTO: 52.7 %
NITRITE UR QL STRIP.AUTO: NEGATIVE
NRBC BLD AUTO-RTO: 0 %
PH UR STRIP.AUTO: 6 [PH]
PLATELET # BLD AUTO: 226 X10(3)/MCL (ref 130–400)
PMV BLD AUTO: 10.6 FL (ref 7.4–10.4)
POTASSIUM SERPL-SCNC: 4.6 MMOL/L (ref 3.5–5.1)
PROT SERPL-MCNC: 6.6 GM/DL (ref 6.4–8.3)
PROT UR QL STRIP.AUTO: NEGATIVE
RBC # BLD AUTO: 4.53 X10(6)/MCL (ref 4.2–5.4)
RBC #/AREA URNS AUTO: <5 /HPF
RBC UR QL AUTO: NEGATIVE
SODIUM SERPL-SCNC: 142 MMOL/L (ref 136–145)
SP GR UR STRIP.AUTO: 1.01 (ref 1–1.03)
SQUAMOUS #/AREA URNS AUTO: <5 /HPF
TRIGL SERPL-MCNC: 56 MG/DL (ref 37–140)
TSH SERPL-ACNC: 2.39 UIU/ML (ref 0.35–4.94)
UROBILINOGEN UR STRIP-ACNC: 0.2
VLDLC SERPL CALC-MCNC: 11 MG/DL
WBC # SPEC AUTO: 6.82 X10(3)/MCL (ref 4.5–11.5)
WBC #/AREA URNS AUTO: <5 /HPF

## 2023-08-22 PROCEDURE — 84443 ASSAY THYROID STIM HORMONE: CPT

## 2023-08-22 PROCEDURE — 80061 LIPID PANEL: CPT

## 2023-08-22 PROCEDURE — 36415 COLL VENOUS BLD VENIPUNCTURE: CPT

## 2023-08-22 PROCEDURE — 81001 URINALYSIS AUTO W/SCOPE: CPT

## 2023-08-22 PROCEDURE — 83036 HEMOGLOBIN GLYCOSYLATED A1C: CPT

## 2023-08-22 PROCEDURE — 85025 COMPLETE CBC W/AUTO DIFF WBC: CPT

## 2023-08-22 PROCEDURE — 80053 COMPREHEN METABOLIC PANEL: CPT

## 2023-08-24 ENCOUNTER — OFFICE VISIT (OUTPATIENT)
Dept: FAMILY MEDICINE | Facility: CLINIC | Age: 58
End: 2023-08-24
Payer: COMMERCIAL

## 2023-08-24 VITALS
RESPIRATION RATE: 17 BRPM | BODY MASS INDEX: 27.62 KG/M2 | DIASTOLIC BLOOD PRESSURE: 81 MMHG | OXYGEN SATURATION: 98 % | HEART RATE: 57 BPM | WEIGHT: 160.88 LBS | SYSTOLIC BLOOD PRESSURE: 126 MMHG

## 2023-08-24 DIAGNOSIS — Z00.00 WELLNESS EXAMINATION: Primary | ICD-10-CM

## 2023-08-24 DIAGNOSIS — Z13.820 SCREENING FOR OSTEOPOROSIS: ICD-10-CM

## 2023-08-24 DIAGNOSIS — Z78.0 POSTMENOPAUSAL: ICD-10-CM

## 2023-08-24 DIAGNOSIS — Z12.31 VISIT FOR SCREENING MAMMOGRAM: ICD-10-CM

## 2023-08-24 DIAGNOSIS — E78.2 MIXED HYPERLIPIDEMIA: ICD-10-CM

## 2023-08-24 PROCEDURE — 99396 PREV VISIT EST AGE 40-64: CPT | Mod: ,,, | Performed by: FAMILY MEDICINE

## 2023-08-24 PROCEDURE — 1159F MED LIST DOCD IN RCRD: CPT | Mod: CPTII,,, | Performed by: FAMILY MEDICINE

## 2023-08-24 PROCEDURE — 1160F PR REVIEW ALL MEDS BY PRESCRIBER/CLIN PHARMACIST DOCUMENTED: ICD-10-PCS | Mod: CPTII,,, | Performed by: FAMILY MEDICINE

## 2023-08-24 PROCEDURE — 3074F SYST BP LT 130 MM HG: CPT | Mod: CPTII,,, | Performed by: FAMILY MEDICINE

## 2023-08-24 PROCEDURE — 3044F HG A1C LEVEL LT 7.0%: CPT | Mod: CPTII,,, | Performed by: FAMILY MEDICINE

## 2023-08-24 PROCEDURE — 99396 PR PREVENTIVE VISIT,EST,40-64: ICD-10-PCS | Mod: ,,, | Performed by: FAMILY MEDICINE

## 2023-08-24 PROCEDURE — 3079F PR MOST RECENT DIASTOLIC BLOOD PRESSURE 80-89 MM HG: ICD-10-PCS | Mod: CPTII,,, | Performed by: FAMILY MEDICINE

## 2023-08-24 PROCEDURE — 3074F PR MOST RECENT SYSTOLIC BLOOD PRESSURE < 130 MM HG: ICD-10-PCS | Mod: CPTII,,, | Performed by: FAMILY MEDICINE

## 2023-08-24 PROCEDURE — 3079F DIAST BP 80-89 MM HG: CPT | Mod: CPTII,,, | Performed by: FAMILY MEDICINE

## 2023-08-24 PROCEDURE — 3044F PR MOST RECENT HEMOGLOBIN A1C LEVEL <7.0%: ICD-10-PCS | Mod: CPTII,,, | Performed by: FAMILY MEDICINE

## 2023-08-24 PROCEDURE — 3008F BODY MASS INDEX DOCD: CPT | Mod: CPTII,,, | Performed by: FAMILY MEDICINE

## 2023-08-24 PROCEDURE — 1160F RVW MEDS BY RX/DR IN RCRD: CPT | Mod: CPTII,,, | Performed by: FAMILY MEDICINE

## 2023-08-24 PROCEDURE — 3008F PR BODY MASS INDEX (BMI) DOCUMENTED: ICD-10-PCS | Mod: CPTII,,, | Performed by: FAMILY MEDICINE

## 2023-08-24 PROCEDURE — 1159F PR MEDICATION LIST DOCUMENTED IN MEDICAL RECORD: ICD-10-PCS | Mod: CPTII,,, | Performed by: FAMILY MEDICINE

## 2023-08-24 NOTE — PROGRESS NOTES
Patient ID: [id number]     Chief Complaint: Annual Exam        HPI:     Krystina Bellamy is a 57 y.o. female here today for annual wellness exam.  Well Adult History   The patient presents for well adult exam. The patient's general health status is described as good. The patient's diet is described as balanced. Exercise: occasional. Associated symptoms consist of denies weight loss, denies weight gain, denies fatigue, denies headache, denies hearing loss and denies vision changes. Last menstrual period: Menopause since 46 years old, last DEXA scan: 03/14/2022, osteopenia, on treatment, no side effects, due for repeat DEXA in 03/2024. Additional pertinent history: last dental exam: goes annually, last eye exam: 02/2023 (history of macular degeneration, wears eyeglasses, followed by [facility]), last MMG was 03/16/2023, WNL at [facility]), Last pap smear: 02/16/2022, WNL with negative HPV, Last Colonoscopy: 10/16/2014 at [facility], WNL, due in 11/2024, seat belt use, occasional caffeine use (tea), tobacco use none, social alcohol use. She had labs done on 08/22/2023, here to discuss the results today. She refuses HIV and Hep C screening. She is UTD on vaccines, she has never had chickenpox as a child and doesn't need Shingrix. She refuses COVID-19 vaccine today.  - Patient is without any other complaints today.     Advance Care Planning     Date: 08/24/2023  Patient did not wish or was not able to name a surrogate decision maker or provide an Advance Care Plan.          ----------------------------  GERD (gastroesophageal reflux disease)  Hyperkalemia  Osteopenia  Vitamin D deficiency     Past Surgical History:   Procedure Laterality Date    TONSILLECTOMY      TUBAL LIGATION         Review of patient's allergies indicates:   Allergen Reactions    Oxycodone-acetaminophen Other (See Comments)     HALUCINATES  Other reaction(s): rash       Outpatient Medications Marked as Taking for the 8/24/23 encounter (Office Visit) with Ana  Nohemy SKELTON MD   Medication Sig Dispense Refill    alendronate (FOSAMAX) 70 MG tablet Take 1 tablet by mouth every 7 days.      diclofenac sodium (VOLTAREN) 1 % Gel Apply 2 g topically 4 (four) times daily as needed (pain). 100 g 2    PROAIR HFA 90 mcg/actuation inhaler INHALE 2 PUFFS BY MOUTH EVERY 6 HOURS AS NEEDED FOR WHEEZING 18 g 0    promethazine-dextromethorphan (PROMETHAZINE-DM) 6.25-15 mg/5 mL Syrp Take 5 mLs by mouth every 6 (six) hours as needed (cough). 120 mL 0       Social History     Socioeconomic History    Marital status:    Tobacco Use    Smoking status: Never    Smokeless tobacco: Never   Substance and Sexual Activity    Alcohol use: Yes     Comment: SOCIALLY    Drug use: No    Sexual activity: Yes     Partners: Male     Birth control/protection: Surgical        Family History   Problem Relation Age of Onset    Heart failure Mother     Hypertension Mother     Kidney failure Mother     Heart attack Father         Subjective:       Review of Systems:    See HPI for details    Constitutional: No fever, No chills, No fatigue.   Eye: Negative except for HPI; No blurring, No visual disturbances.   Ear/Nose/Mouth/Throat: No decreased hearing, No ear pain, No nasal congestion, No sore throat.   Respiratory: No shortness of breath, No cough, No wheezing.   Cardiovascular: No chest pain, No palpitations, No peripheral edema.   Breast: Both breasts, No lump/ mass, No pain.   Nipple discharge: None.   Gastrointestinal: No nausea, No vomiting, No acid reflux, No diarrhea, No constipation, No abdominal pain.   Genitourinary: No dysuria, No hematuria.   Gynecologic: Negative except as documented in history of present illness.   Hematology/Lymphatics: No bruising tendency, No bleeding tendency, No swollen lymph glands.   Endocrine: No excessive thirst, No polyuria, No excessive hunger.   Musculoskeletal: No joint pain, No muscle pain, No decreased range of motion.   Integumentary: No rash, No pruritus.    Neurologic: No abnormal balance, No confusion, No headache.   Psychiatric: No anxiety, No depression, Not suicidal, No hallucinations.     All Other ROS: Negative except as stated in HPI.       Objective:     /81 (BP Location: Right arm, Patient Position: Sitting, BP Method: Large (Automatic))   Pulse (!) 57   Resp 17   Wt 73 kg (160 lb 14.4 oz)   SpO2 98%   BMI 27.62 kg/m²     Physical Exam    General: Alert and oriented, No acute distress.   Appearance: Overweight.   Eye: Pupils are equal, round and reactive to light, Normal conjunctiva.   HENT: Normocephalic, Tympanic membranes are clear, Normal hearing, Oral mucosa is moist, No pharyngeal erythema.   Throat: Pharynx ( Not edematous, No exudate ).   Neck: Supple, Non-tender, No carotid bruit, No lymphadenopathy, No thyromegaly.   Respiratory: Lungs are clear to auscultation, Respirations are non-labored, Breath sounds are equal, Symmetrical chest wall expansion, No chest wall tenderness.   Cardiovascular: Normal rate, Regular rhythm, No murmur, Good pulses equal in all extremities, No edema.   Gastrointestinal: Soft, Non-tender, Non-distended, Normal bowel sounds, No organomegaly, No rebound, No guarding, Negative Espinosa's.   Genitourinary: No costovertebral angle tenderness.   Musculoskeletal: Normal range of motion, No tenderness, Normal gait.   Integumentary: No rashes, No lesions.  Neurologic: No focal deficits, Cranial Nerves II-XII are grossly intact.   Psychiatric: Cooperative, Appropriate mood & affect, Normal judgment, Non-suicidal.   Mood and affect: Calm.   Behavior: Relaxed.     *Lab results from 08/22/2023 were reviewed and discussed with patient and patient voices understanding.*    The 10-year ASCVD risk score (Sue DK, et al., 2019) is: 4.6%    Values used to calculate the score:      Age: 57 years      Sex: Female      Is Non- : Yes      Diabetic: No      Tobacco smoker: No      Systolic Blood Pressure: 126  mmHg      Is BP treated: No      HDL Cholesterol: 60 mg/dL      Total Cholesterol: 215 mg/dL       Assessment:       ICD-10-CM ICD-9-CM   1. Wellness examination  Z00.00 V70.0   2. Visit for screening mammogram  Z12.31 V76.12   3. Postmenopausal  Z78.0 V49.81   4. Screening for osteoporosis  Z13.820 V82.81   5. Mixed hyperlipidemia  E78.2 272.2        Plan:     Problem List Items Addressed This Visit          Cardiac/Vascular    Mixed hyperlipidemia     Other Visit Diagnoses       Wellness examination    -  Primary    Relevant Orders    CBC Auto Differential    Comprehensive Metabolic Panel    Hemoglobin A1C    Lipid Panel    TSH    Urinalysis, Reflex to Urine Culture    Visit for screening mammogram        Relevant Orders    Mammo Digital Screening Bilat w/ Delroy    Postmenopausal        Relevant Orders    DXA Bone Density Axial Skeleton 1 or more sites    Screening for osteoporosis        Relevant Orders    DXA Bone Density Axial Skeleton 1 or more sites         1. Wellness examination  - CBC Auto Differential; Future in 08/2024  - Comprehensive Metabolic Panel; Future in 08/2024  - Hemoglobin A1C; Future in 08/2024  - Lipid Panel; Future in 08/2024  - TSH; Future in 08/2024  - Urinalysis, Reflex to Urine Culture; Future in 08/2024  - Monthly breast self exam encouraged. Diet, exercise, and 10% weight loss encouraged. Keep appointment for dental exams x q6 months as scheduled. Keep appointment for annual eye exam as scheduled. Notify M.D. or ER if temp greater than 100.4, or any acute illness.      2. Visit for screening mammogram  - Mammo Digital Screening Bilat w/ Delroy; Future    3. Postmenopausal  - DXA Bone Density Axial Skeleton 1 or more sites; Future    4. Screening for osteoporosis  - DXA Bone Density Axial Skeleton 1 or more sites; Future    5. Mixed hyperlipidemia  - Cholesterol is elevated, but ASCVD risk score is low, needs low cholesterol diet and OTC omega-3/fish acids as directed, recheck FLP in  08/2024.  Continue  Stressed importance of dietary modifications. Follow a low cholesterol, low saturated fat diet with less that 200mg of cholesterol a day.  Avoid fried foods and high saturated fats (high saturated fats less than 7% of calories).  Add Flax Seed/Fish Oil supplements to diet. Increase dietary fiber.  Regular exercise can reduce LDL and raise HDL. Stressed importance of physical activity 5 times per week for 30 minutes per day.        Krystina was seen today for annual exam.    Diagnoses and all orders for this visit:    Wellness examination  -     CBC Auto Differential; Future  -     Comprehensive Metabolic Panel; Future  -     Hemoglobin A1C; Future  -     Lipid Panel; Future  -     TSH; Future  -     Urinalysis, Reflex to Urine Culture; Future    Visit for screening mammogram  -     Mammo Digital Screening Bilat w/ Delroy; Future    Postmenopausal  -     DXA Bone Density Axial Skeleton 1 or more sites; Future    Screening for osteoporosis  -     DXA Bone Density Axial Skeleton 1 or more sites; Future    Mixed hyperlipidemia          Medication List with Changes/Refills   Current Medications    ALENDRONATE (FOSAMAX) 70 MG TABLET    Take 1 tablet by mouth every 7 days.       Start Date: 6/6/2022  End Date: --    DICLOFENAC SODIUM (VOLTAREN) 1 % GEL    Apply 2 g topically 4 (four) times daily as needed (pain).       Start Date: 2/28/2023 End Date: --    PROAIR HFA 90 MCG/ACTUATION INHALER    INHALE 2 PUFFS BY MOUTH EVERY 6 HOURS AS NEEDED FOR WHEEZING       Start Date: 6/6/2023  End Date: --    PROMETHAZINE-DEXTROMETHORPHAN (PROMETHAZINE-DM) 6.25-15 MG/5 ML SYRP    Take 5 mLs by mouth every 6 (six) hours as needed (cough).       Start Date: 6/6/2023  End Date: --          Follow up in about 1 year (around 8/24/2024) for Wellness.

## 2023-10-02 ENCOUNTER — HOSPITAL ENCOUNTER (EMERGENCY)
Facility: HOSPITAL | Age: 58
Discharge: HOME | End: 2023-10-02
Attending: STUDENT IN AN ORGANIZED HEALTH CARE EDUCATION/TRAINING PROGRAM
Payer: COMMERCIAL

## 2023-10-02 VITALS
RESPIRATION RATE: 16 BRPM | BODY MASS INDEX: 29.62 KG/M2 | OXYGEN SATURATION: 99 % | TEMPERATURE: 98 F | WEIGHT: 160.94 LBS | SYSTOLIC BLOOD PRESSURE: 143 MMHG | DIASTOLIC BLOOD PRESSURE: 88 MMHG | HEART RATE: 78 BPM | HEIGHT: 62 IN

## 2023-10-02 DIAGNOSIS — M54.42 ACUTE LEFT-SIDED LOW BACK PAIN WITH LEFT-SIDED SCIATICA: Primary | ICD-10-CM

## 2023-10-02 PROCEDURE — 99284 EMERGENCY DEPT VISIT MOD MDM: CPT

## 2023-10-02 PROCEDURE — 25000003 PHARM REV CODE 250: Performed by: NURSE PRACTITIONER

## 2023-10-02 RX ORDER — BACLOFEN 10 MG/1
10 TABLET ORAL 3 TIMES DAILY PRN
Qty: 21 TABLET | Refills: 0 | Status: SHIPPED | OUTPATIENT
Start: 2023-10-02

## 2023-10-02 RX ORDER — KETOROLAC TROMETHAMINE 10 MG/1
10 TABLET, FILM COATED ORAL
Status: COMPLETED | OUTPATIENT
Start: 2023-10-02 | End: 2023-10-02

## 2023-10-02 RX ORDER — INDOMETHACIN 25 MG/1
25 CAPSULE ORAL 2 TIMES DAILY PRN
Qty: 14 CAPSULE | Refills: 0 | Status: SHIPPED | OUTPATIENT
Start: 2023-10-02

## 2023-10-02 RX ADMIN — KETOROLAC TROMETHAMINE 10 MG: 10 TABLET, FILM COATED ORAL at 09:10

## 2023-10-02 NOTE — ED PROVIDER NOTES
Encounter Date: 10/2/2023       History     Chief Complaint   Patient presents with    Back Pain     Left lower back pain radiating down left leg for years but worse over the past week. Pain worse with walking and bending over.      Pt is a 57 y.o. female who presents to the [facility] ED complaining of lower back pain. Reports pain radiations into her Lt leg. Pt currently employed at a local [employer] and admits to chronic pain to area. Uncertain if injury to back but admits the issue seems to have worsened over the last few days. Denies loss of bowel or bladder control, chest pain, SOB, weakness, dizziness, or fever.      Review of patient's allergies indicates:   Allergen Reactions    Oxycodone-acetaminophen Other (See Comments)     HALUCINATES  Other reaction(s): rash     Past Medical History:   Diagnosis Date    GERD (gastroesophageal reflux disease)     Hyperkalemia     Osteopenia     Vitamin D deficiency      Past Surgical History:   Procedure Laterality Date    TONSILLECTOMY      TUBAL LIGATION       Family History   Problem Relation Age of Onset    Heart failure Mother     Hypertension Mother     Kidney failure Mother     Heart attack Father      Social History     Tobacco Use    Smoking status: Never    Smokeless tobacco: Never   Substance Use Topics    Alcohol use: Yes     Comment: SOCIALLY    Drug use: No     Review of Systems   Constitutional:  Negative for chills, diaphoresis, fatigue and fever.   HENT:  Negative for facial swelling, rhinorrhea, sinus pressure, sinus pain, sore throat and trouble swallowing.    Respiratory:  Negative for cough, chest tightness, shortness of breath and wheezing.    Cardiovascular:  Negative for chest pain, palpitations and leg swelling.   Gastrointestinal:  Negative for abdominal pain, diarrhea, nausea and vomiting.   Genitourinary:  Negative for dysuria, flank pain, frequency, hematuria and urgency.   Musculoskeletal:  Positive for arthralgias and back pain. Negative for  joint swelling and myalgias.   Skin:  Negative for color change and rash.   Neurological:  Negative for dizziness, syncope, weakness and light-headedness.   Hematological:  Does not bruise/bleed easily.   All other systems reviewed and are negative.      Physical Exam     Initial Vitals [10/02/23 0915]   BP Pulse Resp Temp SpO2   (!) 163/81 80 20 97.5 °F (36.4 °C) 98 %      MAP       --         Physical Exam    Nursing note and vitals reviewed.  Constitutional: She appears well-developed and well-nourished.   HENT:   Head: Normocephalic and atraumatic.   Nose: Nose normal.   Mouth/Throat: Oropharynx is clear and moist.   Eyes: Conjunctivae and EOM are normal. Pupils are equal, round, and reactive to light.   Neck: Neck supple.   Normal range of motion.  Cardiovascular:  Normal rate, regular rhythm, normal heart sounds and intact distal pulses.           Pulmonary/Chest: Effort normal and breath sounds normal. No respiratory distress. She has no wheezes. She has no rhonchi. She has no rales. She exhibits no tenderness.   Abdominal: Abdomen is soft and flat. Bowel sounds are normal. She exhibits no distension. There is no abdominal tenderness. There is no rebound, no guarding, no tenderness at McBurney's point and negative Espinosa's sign. negative psoas sign  Musculoskeletal:         General: Normal range of motion.      Cervical back: Normal range of motion and neck supple.      Lumbar back: Spasms and tenderness present. No swelling, edema, deformity or signs of trauma.        Back:      Neurological: She is alert and oriented to person, place, and time. She has normal strength and normal reflexes.   Skin: Skin is warm and dry. Capillary refill takes less than 2 seconds.   Psychiatric: She has a normal mood and affect. Her speech is normal and behavior is normal. Judgment and thought content normal.         ED Course   Procedures  Labs Reviewed - No data to display       Imaging Results              X-Ray Lumbar  Spine Ap And Lateral (Final result)  Result time 10/02/23 10:21:31      Final result by Mae Lopez MD (10/02/23 10:21:31)                   Impression:      1. No acute abnormality identified.  2. Multilevel degenerative changes of the lumbar spine.      Electronically signed by: Mae Lopez  Date:    10/02/2023  Time:    10:21               Narrative:    EXAMINATION:  XR LUMBAR SPINE AP AND LATERAL    CLINICAL HISTORY:  low back pain left sciatica;    COMPARISON:  X-rays dated 03/26/2015    FINDINGS:  There are 5 non-rib-bearing lumbar type vertebral bodies.  Alignment is normal.  The vertebral body heights and disc spaces are maintained.  There are small multilevel marginal osteophytes.  Mild facet arthropathy is present in the lower lumbar spine.  The soft tissues are unremarkable.                                       Medications   ketorolac tablet 10 mg (10 mg Oral Given 10/2/23 0939)     Medical Decision Making  Differential:  Low back strain  DDD  Fracture  Sciatica    Amount and/or Complexity of Data Reviewed  Radiology: ordered.    Risk  Prescription drug management.               ED Course as of 10/02/23 1035   Mon Oct 02, 2023   1034 10:34 AM Reassessed patient at this time. Reports condition has improved. Discussed with patient all pertinent ED information and results. Discussed diagnosis and treatment plan with patient. Follow up instructions and return to ED instruction have been given. All questions and concerns were addressed at this time. Patient voices understanding of information and instructions. Patient is comfortable with plan and discharge. Patient is stable for discharge.    [JA]      ED Course User Index  [JA] Sang Matthews Jr., FNP                    Clinical Impression:   Final diagnoses:  [M54.42] Acute left-sided low back pain with left-sided sciatica (Primary)        ED Disposition Condition    Discharge Stable          ED Prescriptions       Medication Sig Dispense  Start Date End Date Auth. Provider    indomethacin (INDOCIN) 25 MG capsule Take 1 capsule (25 mg total) by mouth 2 (two) times daily as needed (pain). 14 capsule 10/2/2023 -- Sang Matthews Jr., FNP    baclofen (LIORESAL) 10 MG tablet Take 1 tablet (10 mg total) by mouth 3 (three) times daily as needed (muscle spasms). 21 tablet 10/2/2023 -- Sang Matthews Jr., FNP          Follow-up Information       Follow up With Specialties Details Why Contact Info    Nohemy Perez MD Family Medicine In 3 days  [address] [facility]  [address]  [facility] [id number] [phone]      [facility] Emergency Medicine In 3 days As needed, If symptoms worsen [address] [facility] [address] [phone]             Sang Matthews Jr., FNP  10/02/23 2156

## 2024-03-22 ENCOUNTER — HOSPITAL ENCOUNTER (OUTPATIENT)
Dept: RADIOLOGY | Facility: HOSPITAL | Age: 59
Discharge: HOME | End: 2024-03-22
Attending: FAMILY MEDICINE
Payer: COMMERCIAL

## 2024-03-22 DIAGNOSIS — Z78.0 POSTMENOPAUSAL: ICD-10-CM

## 2024-03-22 DIAGNOSIS — Z13.820 SCREENING FOR OSTEOPOROSIS: ICD-10-CM

## 2024-03-22 DIAGNOSIS — Z12.31 VISIT FOR SCREENING MAMMOGRAM: ICD-10-CM

## 2024-03-22 PROCEDURE — 77063 BREAST TOMOSYNTHESIS BI: CPT | Mod: 26,,, | Performed by: RADIOLOGY

## 2024-03-22 PROCEDURE — 77067 SCR MAMMO BI INCL CAD: CPT | Mod: TC

## 2024-03-22 PROCEDURE — 77080 DXA BONE DENSITY AXIAL: CPT | Mod: TC

## 2024-03-22 PROCEDURE — 77080 DXA BONE DENSITY AXIAL: CPT | Mod: 26,,, | Performed by: RADIOLOGY

## 2024-03-22 PROCEDURE — 77067 SCR MAMMO BI INCL CAD: CPT | Mod: 26,,, | Performed by: RADIOLOGY

## 2024-03-25 ENCOUNTER — TELEPHONE (OUTPATIENT)
Dept: FAMILY MEDICINE | Facility: CLINIC | Age: 59
End: 2024-03-25
Payer: COMMERCIAL

## 2024-03-25 NOTE — TELEPHONE ENCOUNTER
----- Message from Nohemy Perez MD sent at 3/25/2024  2:39 PM CDT -----  Mammogram shows benign findings, no mammographic evidence of malignancy, routine Mammogram in 03/2025.

## 2024-03-25 NOTE — TELEPHONE ENCOUNTER
----- Message from Nohemy Perez MD sent at 3/25/2024  6:40 AM CDT -----  DEXA scan shows osteopenia (thinning of her bones) of both of her femurs and increases her risk of a femur fracture, no osteoporosis, continue Fosamax and OTC calcium plus Vitamin D supplement as directed to help strengthen her bones. Will need to repeat DEXA scan in 03/2026.

## 2024-08-20 ENCOUNTER — TELEPHONE (OUTPATIENT)
Dept: FAMILY MEDICINE | Facility: CLINIC | Age: 59
End: 2024-08-20
Payer: COMMERCIAL

## 2024-08-20 NOTE — TELEPHONE ENCOUNTER
Are there any outstanding tasks in the patients's chart (ex.labs,MM,etc)?  yes  Do we have outstanding/pending referrals?  no  Has the patient been seen in and ER,UCC, or been admitted since last visit?  yes  Has the patient seen any other health care provider(doctors) since last visit?  no  Has the patient had any bloodwork or x-rays done since last visit?  no

## 2024-08-22 ENCOUNTER — LAB VISIT (OUTPATIENT)
Dept: LAB | Facility: HOSPITAL | Age: 59
End: 2024-08-22
Attending: FAMILY MEDICINE
Payer: COMMERCIAL

## 2024-08-22 DIAGNOSIS — Z00.00 WELLNESS EXAMINATION: ICD-10-CM

## 2024-08-22 LAB
ALBUMIN SERPL-MCNC: 3.8 G/DL (ref 3.4–4.8)
ALBUMIN/GLOB SERPL: 1.5 RATIO (ref 1.1–2)
ALP SERPL-CCNC: 103 UNIT/L (ref 40–150)
ALT SERPL-CCNC: 9 UNIT/L (ref 0–55)
ANION GAP SERPL CALC-SCNC: 8 MEQ/L
AST SERPL-CCNC: 14 UNIT/L (ref 5–34)
BACTERIA #/AREA URNS AUTO: ABNORMAL /HPF
BASOPHILS # BLD AUTO: 0.04 X10(3)/MCL
BASOPHILS NFR BLD AUTO: 0.7 %
BILIRUB SERPL-MCNC: 0.4 MG/DL
BILIRUB UR QL STRIP.AUTO: NEGATIVE
BUN SERPL-MCNC: 12.7 MG/DL (ref 9.8–20.1)
CALCIUM SERPL-MCNC: 8.8 MG/DL (ref 8.4–10.2)
CHLORIDE SERPL-SCNC: 109 MMOL/L (ref 98–107)
CHOLEST SERPL-MCNC: 200 MG/DL
CHOLEST/HDLC SERPL: 3 {RATIO} (ref 0–5)
CLARITY UR: CLEAR
CO2 SERPL-SCNC: 24 MMOL/L (ref 23–31)
COLOR UR AUTO: COLORLESS
CREAT SERPL-MCNC: 0.88 MG/DL (ref 0.55–1.02)
CREAT/UREA NIT SERPL: 14
EOSINOPHIL # BLD AUTO: 0.12 X10(3)/MCL (ref 0–0.9)
EOSINOPHIL NFR BLD AUTO: 2.1 %
ERYTHROCYTE [DISTWIDTH] IN BLOOD BY AUTOMATED COUNT: 14.7 % (ref 11.5–17)
EST. AVERAGE GLUCOSE BLD GHB EST-MCNC: 114 MG/DL
GFR SERPLBLD CREATININE-BSD FMLA CKD-EPI: >60 ML/MIN/1.73/M2
GLOBULIN SER-MCNC: 2.5 GM/DL (ref 2.4–3.5)
GLUCOSE SERPL-MCNC: 99 MG/DL (ref 82–115)
GLUCOSE UR QL STRIP: NORMAL
HBA1C MFR BLD: 5.6 %
HCT VFR BLD AUTO: 38.4 % (ref 37–47)
HDLC SERPL-MCNC: 59 MG/DL (ref 35–60)
HGB BLD-MCNC: 12.4 G/DL (ref 12–16)
HGB UR QL STRIP: NEGATIVE
IMM GRANULOCYTES # BLD AUTO: 0 X10(3)/MCL (ref 0–0.04)
IMM GRANULOCYTES NFR BLD AUTO: 0 %
KETONES UR QL STRIP: NEGATIVE
LDLC SERPL CALC-MCNC: 127 MG/DL (ref 50–140)
LEUKOCYTE ESTERASE UR QL STRIP: NEGATIVE
LYMPHOCYTES # BLD AUTO: 2.4 X10(3)/MCL (ref 0.6–4.6)
LYMPHOCYTES NFR BLD AUTO: 42 %
MCH RBC QN AUTO: 28.1 PG (ref 27–31)
MCHC RBC AUTO-ENTMCNC: 32.3 G/DL (ref 33–36)
MCV RBC AUTO: 87.1 FL (ref 80–94)
MONOCYTES # BLD AUTO: 0.54 X10(3)/MCL (ref 0.1–1.3)
MONOCYTES NFR BLD AUTO: 9.5 %
MUCOUS THREADS URNS QL MICRO: ABNORMAL /LPF
NEUTROPHILS # BLD AUTO: 2.61 X10(3)/MCL (ref 2.1–9.2)
NEUTROPHILS NFR BLD AUTO: 45.7 %
NITRITE UR QL STRIP: NEGATIVE
NRBC BLD AUTO-RTO: 0 %
PH UR STRIP: 5.5 [PH]
PLATELET # BLD AUTO: 229 X10(3)/MCL (ref 130–400)
PMV BLD AUTO: 10.7 FL (ref 7.4–10.4)
POTASSIUM SERPL-SCNC: 4.1 MMOL/L (ref 3.5–5.1)
PROT SERPL-MCNC: 6.3 GM/DL (ref 5.8–7.6)
PROT UR QL STRIP: NEGATIVE
RBC # BLD AUTO: 4.41 X10(6)/MCL (ref 4.2–5.4)
RBC #/AREA URNS AUTO: ABNORMAL /HPF
SODIUM SERPL-SCNC: 141 MMOL/L (ref 136–145)
SP GR UR STRIP.AUTO: 1.01 (ref 1–1.03)
SQUAMOUS #/AREA URNS LPF: ABNORMAL /HPF
TRIGL SERPL-MCNC: 69 MG/DL (ref 37–140)
TSH SERPL-ACNC: 1.72 UIU/ML (ref 0.35–4.94)
UROBILINOGEN UR STRIP-ACNC: NORMAL
VLDLC SERPL CALC-MCNC: 14 MG/DL
WBC # BLD AUTO: 5.71 X10(3)/MCL (ref 4.5–11.5)
WBC #/AREA URNS AUTO: ABNORMAL /HPF

## 2024-08-22 PROCEDURE — 85025 COMPLETE CBC W/AUTO DIFF WBC: CPT

## 2024-08-22 PROCEDURE — 84443 ASSAY THYROID STIM HORMONE: CPT

## 2024-08-22 PROCEDURE — 80061 LIPID PANEL: CPT

## 2024-08-22 PROCEDURE — 80053 COMPREHEN METABOLIC PANEL: CPT

## 2024-08-22 PROCEDURE — 81015 MICROSCOPIC EXAM OF URINE: CPT

## 2024-08-22 PROCEDURE — 83036 HEMOGLOBIN GLYCOSYLATED A1C: CPT

## 2024-08-22 PROCEDURE — 81001 URINALYSIS AUTO W/SCOPE: CPT

## 2024-08-22 PROCEDURE — 36415 COLL VENOUS BLD VENIPUNCTURE: CPT

## 2024-08-26 ENCOUNTER — OFFICE VISIT (OUTPATIENT)
Dept: FAMILY MEDICINE | Facility: CLINIC | Age: 59
End: 2024-08-26
Payer: COMMERCIAL

## 2024-08-26 VITALS
HEIGHT: 62 IN | BODY MASS INDEX: 28.71 KG/M2 | OXYGEN SATURATION: 99 % | HEART RATE: 74 BPM | WEIGHT: 156 LBS | SYSTOLIC BLOOD PRESSURE: 125 MMHG | RESPIRATION RATE: 16 BRPM | DIASTOLIC BLOOD PRESSURE: 80 MMHG

## 2024-08-26 DIAGNOSIS — Z00.00 WELLNESS EXAMINATION: Primary | ICD-10-CM

## 2024-08-26 DIAGNOSIS — Z12.31 VISIT FOR SCREENING MAMMOGRAM: ICD-10-CM

## 2024-08-26 DIAGNOSIS — Z12.11 SCREENING FOR COLON CANCER: ICD-10-CM

## 2024-08-26 PROCEDURE — 3044F HG A1C LEVEL LT 7.0%: CPT | Mod: CPTII,,, | Performed by: FAMILY MEDICINE

## 2024-08-26 PROCEDURE — 1159F MED LIST DOCD IN RCRD: CPT | Mod: CPTII,,, | Performed by: FAMILY MEDICINE

## 2024-08-26 PROCEDURE — 3008F BODY MASS INDEX DOCD: CPT | Mod: CPTII,,, | Performed by: FAMILY MEDICINE

## 2024-08-26 PROCEDURE — 1160F RVW MEDS BY RX/DR IN RCRD: CPT | Mod: CPTII,,, | Performed by: FAMILY MEDICINE

## 2024-08-26 PROCEDURE — 99396 PREV VISIT EST AGE 40-64: CPT | Mod: ,,, | Performed by: FAMILY MEDICINE

## 2024-08-26 PROCEDURE — 3079F DIAST BP 80-89 MM HG: CPT | Mod: CPTII,,, | Performed by: FAMILY MEDICINE

## 2024-08-26 PROCEDURE — 3074F SYST BP LT 130 MM HG: CPT | Mod: CPTII,,, | Performed by: FAMILY MEDICINE

## 2024-08-26 NOTE — PATIENT INSTRUCTIONS
Jimbo Flaherty,     If you are due for any health screening(s) below please notify me so we can arrange them to be ordered and scheduled. Most healthy patients at your age complete them, but you are free to accept or refuse.     If you can't do it, I'll definitely understand. If you can, I'd certainly appreciate it!    All of your core healthy metrics are met.

## 2024-08-26 NOTE — PROGRESS NOTES
Patient ID: [id number]     Chief Complaint: Annual Exam        HPI:     Krystina Bellamy is a 58 y.o. female here today for annual wellness exam.   Well Adult History   The patient presents for well adult exam. The patient's general health status is described as good. The patient's diet is described as balanced. Exercise: occasional. Associated symptoms consist of denies weight loss, denies weight gain, denies fatigue, denies headache, denies hearing loss and denies vision changes. Last menstrual period: Menopause since 46 years old, last DEXA scan: 03/22/2024, osteopenia, on treatment, no side effects, due for repeat DEXA in 03/2026. Additional pertinent history: last dental exam: goes annually, last eye exam: 02/2024, goes twice yearly (history of macular degeneration, wears eyeglasses, followed by [facility]), last MMG was 03/22/2024, WNL at [facility]), Last pap smear: 02/16/2022, WNL with negative HPV, Last Colonoscopy: 10/16/2014 at [facility], WNL, due in 11/2024, she denies family history of colon cancer or personal history of colon polyps, she would like Cologuard, seat belt use, occasional caffeine use (tea), tobacco use none, social alcohol use. She had labs done on 08/22/2024, here to discuss the results today. She refuses HIV and Hep C screening. She is UTD on vaccines, she has never had chickenpox as a child and doesn't need Shingrix. She refuses COVID-19 and RSV vaccines today. She has negative Cardiac workup with Cardiology at [facility], asymptomatic.   - Patient is without any other complaints today.      Advance Care Planning     Date: 08/26/2024    Living Will  During this visit, I engaged the patient  in the voluntary advance care planning process.  The patient and I reviewed the role for advance directives and their purpose in directing future healthcare if the patient's unable to speak for him/herself.  At this point in time, the patient does have full decision-making capacity.  We discussed different extreme  health states that she could experience, and reviewed what kind of medical care she would want in those situations.  The patient communicated that if she were comatose and had little chance of a meaningful recovery, she would not want machines/life-sustaining treatments used. In addition to the above preference, other important end-of-life issues for the patient include  Patient is not an organ donor . The patient has completed a living will to reflect these preferences.  I spent a total of 5 minutes engaging the patient in this advance care planning discussion.                  -------------------------------------    GERD (gastroesophageal reflux disease)    Hyperkalemia    Osteopenia    Vitamin D deficiency        Past Surgical History:   Procedure Laterality Date    TONSILLECTOMY      TUBAL LIGATION         Review of patient's allergies indicates:   Allergen Reactions    Oxycodone-acetaminophen Other (See Comments)     HALUCINATES  Other reaction(s): rash       Outpatient Medications Marked as Taking for the 8/26/24 encounter (Office Visit) with Nohemy Perez MD   Medication Sig Dispense Refill    alendronate (FOSAMAX) 70 MG tablet Take 1 tablet by mouth every 7 days.      diclofenac sodium (VOLTAREN) 1 % Gel Apply 2 g topically 4 (four) times daily as needed (pain). 100 g 2       Social History     Socioeconomic History    Marital status:    Tobacco Use    Smoking status: Never    Smokeless tobacco: Never   Substance and Sexual Activity    Alcohol use: Yes     Comment: SOCIALLY    Drug use: No    Sexual activity: Yes     Partners: Male     Birth control/protection: Surgical        Family History   Problem Relation Name Age of Onset    Heart failure Mother      Hypertension Mother      Kidney failure Mother      Heart attack Father          Subjective:       Review of Systems:    See HPI for details    Constitutional: Denies Change in appetite. Denies Chills. Denies Fever. Denies Night  "sweats.  Eye: Denies Blurred vision. Denies Discharge. Denies Eye pain.  ENT: Denies Decreased hearing. Denies Sore throat. Denies Swollen glands.  Respiratory: Denies Cough. Denies Shortness of breath. Denies Shortness of breath with exertion. Denies Wheezing.  Cardiovascular: Denies Chest pain at rest. Denies Chest pain with exertion. Denies Irregular heartbeat. Denies Palpitations.  Gastrointestinal: Denies Abdominal pain. Denies Diarrhea. Denies Nausea. Denies Vomiting. Denies Hematemesis or Hematochezia.  Genitourinary: Denies Dysuria. Denies Urinary frequency. Denies Urinary urgency. Denies Blood in urine.  Endocrine: Denies Cold intolerance. Denies Excessive thirst. Denies Heat intolerance. Denies Weight loss. Denies Weight gain.  Musculoskeletal: Denies Painful joints. Denies Weakness.  Integumentary: Denies Rash. Denies Itching. Denies Dry skin.  Neurologic: Denies Dizziness. Denies Fainting. Denies Headache.  Psychiatric: Denies Depression. Denies Anxiety. Denies Suicidal/Homicidal ideations.    All Other ROS: Negative except as stated in HPI.       Objective:     /80 (BP Location: Left arm, Patient Position: Sitting, BP Method: Large (Automatic))   Pulse 74   Resp 16   Ht 5' 2" (1.575 m)   Wt 70.8 kg (156 lb)   SpO2 99%   BMI 28.53 kg/m²     Physical Exam    General: Alert and oriented, No acute distress. Overweight.   Head: Normocephalic, Atraumatic.  Eye: Pupils are equal, round and reactive to light, Extraocular movements are intact, Sclera non-icteric.  Ears/Nose/Throat: Normal, Mucosa moist,Clear.  Neck/Thyroid: Supple, Non-tender, No carotid bruit, No palpable thyromegaly or thyroid nodule, No lymphadenopathy, No JVD, Full range of motion.  Respiratory: Clear to auscultation bilaterally; No wheezes, rales or rhonchi,Non-labored respirations, Symmetrical chest wall expansion.  Cardiovascular: Regular rate and rhythm, S1/S2 normal, No murmurs, rubs or gallops.  Gastrointestinal: Soft, " Non-tender, Non-distended, Normal bowel sounds, No palpable organomegaly.  Musculoskeletal: Normal range of motion.  Integumentary: Warm, Dry, Intact, No suspicious lesions or rashes.  Extremities: No clubbing, cyanosis or edema  Neurologic: No focal deficits, Cranial Nerves II-XII are grossly intact, Motor strength normal upper and lower extremities, Sensory exam intact.  Psychiatric: Normal interaction, Coherent speech, Euthymic mood, Appropriate affect     *Lab results from 08/22/2024 were reviewed and discussed with patient and patient voices understanding.*      The 10-year ASCVD risk score (Sue ENNIS, et al., 2019) is: 4.6%    Values used to calculate the score:      Age: 58 years      Sex: Female      Is Non- : Yes      Diabetic: No      Tobacco smoker: No      Systolic Blood Pressure: 125 mmHg      Is BP treated: No      HDL Cholesterol: 59 mg/dL      Total Cholesterol: 200 mg/dL     Assessment:       ICD-10-CM ICD-9-CM   1. Wellness examination  Z00.00 V70.0   2. Visit for screening mammogram  Z12.31 V76.12   3. Screening for colon cancer  Z12.11 V76.51        Plan:     Problem List Items Addressed This Visit    None  Visit Diagnoses       Wellness examination    -  Primary    Relevant Orders    CBC Auto Differential    Comprehensive Metabolic Panel    Hemoglobin A1C    Lipid Panel    TSH    Vitamin D    Urinalysis, Reflex to Urine Culture    Visit for screening mammogram        Relevant Orders    Mammo Digital Screening Bilat w/ Delroy    Screening for colon cancer        Relevant Orders    Cologuard Screening (Multitarget Stool DNA)         1. Wellness examination  - CBC Auto Differential; Future in 08/2025.  - Comprehensive Metabolic Panel; Future in 08/2025.  - Hemoglobin A1C; Future in 08/2025.  - Lipid Panel; Future in 08/2025.  - TSH; Future in 08/2025.  - Vitamin D; Future in 08/2025.  - Urinalysis, Reflex to Urine Culture; Future in 08/2025.  - Monthly breast self exam  encouraged. Diet, exercise, and 10% weight loss encouraged. Keep appointment for dental exams x q6 months as scheduled. Keep appointment for annual eye exam as scheduled. Keep appointment with GYN for annual pap smear as scheduled. Notify M.D. or ER if temp greater than 100.4, or any acute illness.      2. Visit for screening mammogram  - Mammo Digital Screening Bilat w/ Delroy; Future in 03/2025    3. Screening for colon cancer  - Cologuard Screening (Multitarget Stool DNA); Future  - Cologuard Screening (Multitarget Stool DNA)        Krystina was seen today for annual exam.    Diagnoses and all orders for this visit:    Wellness examination  -     CBC Auto Differential; Future  -     Comprehensive Metabolic Panel; Future  -     Hemoglobin A1C; Future  -     Lipid Panel; Future  -     TSH; Future  -     Vitamin D; Future  -     Urinalysis, Reflex to Urine Culture; Future    Visit for screening mammogram  -     Mammo Digital Screening Bilat w/ Delroy; Future    Screening for colon cancer  -     Cologuard Screening (Multitarget Stool DNA); Future  -     Cologuard Screening (Multitarget Stool DNA)          Medication List with Changes/Refills   Current Medications    ALENDRONATE (FOSAMAX) 70 MG TABLET    Take 1 tablet by mouth every 7 days.       Start Date: 6/6/2022  End Date: --    DICLOFENAC SODIUM (VOLTAREN) 1 % GEL    Apply 2 g topically 4 (four) times daily as needed (pain).       Start Date: 2/28/2023 End Date: --   Discontinued Medications    BACLOFEN (LIORESAL) 10 MG TABLET    Take 1 tablet (10 mg total) by mouth 3 (three) times daily as needed (muscle spasms).       Start Date: 10/2/2023 End Date: 8/26/2024    INDOMETHACIN (INDOCIN) 25 MG CAPSULE    Take 1 capsule (25 mg total) by mouth 2 (two) times daily as needed (pain).       Start Date: 10/2/2023 End Date: 8/26/2024    PROAIR HFA 90 MCG/ACTUATION INHALER    INHALE 2 PUFFS BY MOUTH EVERY 6 HOURS AS NEEDED FOR WHEEZING       Start Date: 6/6/2023  End Date:  8/26/2024    PROMETHAZINE-DEXTROMETHORPHAN (PROMETHAZINE-DM) 6.25-15 MG/5 ML SYRP    Take 5 mLs by mouth every 6 (six) hours as needed (cough).       Start Date: 6/6/2023  End Date: 8/26/2024          Follow up in about 1 year (around 8/26/2025) for Wellness.

## 2024-09-05 ENCOUNTER — TELEPHONE (OUTPATIENT)
Dept: FAMILY MEDICINE | Facility: CLINIC | Age: 59
End: 2024-09-05
Payer: COMMERCIAL

## 2024-09-05 NOTE — TELEPHONE ENCOUNTER
----- Message from Nohemy Perez MD sent at 9/5/2024  9:54 AM CDT -----  COLOGUARD NORMAL-Your Cologuard is normal. Please repeat in 3 years.

## 2025-03-27 ENCOUNTER — HOSPITAL ENCOUNTER (OUTPATIENT)
Dept: RADIOLOGY | Facility: HOSPITAL | Age: 60
Discharge: HOME | End: 2025-03-27
Attending: FAMILY MEDICINE
Payer: COMMERCIAL

## 2025-03-27 DIAGNOSIS — Z12.31 VISIT FOR SCREENING MAMMOGRAM: ICD-10-CM

## 2025-03-27 PROCEDURE — 77063 BREAST TOMOSYNTHESIS BI: CPT | Mod: 26,,, | Performed by: STUDENT IN AN ORGANIZED HEALTH CARE EDUCATION/TRAINING PROGRAM

## 2025-03-27 PROCEDURE — 77067 SCR MAMMO BI INCL CAD: CPT | Mod: 26,,, | Performed by: STUDENT IN AN ORGANIZED HEALTH CARE EDUCATION/TRAINING PROGRAM

## 2025-03-27 PROCEDURE — 77067 SCR MAMMO BI INCL CAD: CPT | Mod: TC

## 2025-03-28 ENCOUNTER — RESULTS FOLLOW-UP (OUTPATIENT)
Dept: FAMILY MEDICINE | Facility: CLINIC | Age: 60
End: 2025-03-28

## 2025-03-31 ENCOUNTER — PATIENT MESSAGE (OUTPATIENT)
Dept: FAMILY MEDICINE | Facility: CLINIC | Age: 60
End: 2025-03-31
Payer: COMMERCIAL

## 2025-07-22 ENCOUNTER — HOSPITAL ENCOUNTER (EMERGENCY)
Facility: HOSPITAL | Age: 60
Discharge: HOME | End: 2025-07-22
Attending: EMERGENCY MEDICINE
Payer: COMMERCIAL

## 2025-07-22 VITALS
TEMPERATURE: 99 F | HEART RATE: 88 BPM | SYSTOLIC BLOOD PRESSURE: 172 MMHG | HEIGHT: 62 IN | OXYGEN SATURATION: 97 % | DIASTOLIC BLOOD PRESSURE: 93 MMHG | WEIGHT: 165 LBS | BODY MASS INDEX: 30.36 KG/M2 | RESPIRATION RATE: 18 BRPM

## 2025-07-22 DIAGNOSIS — M25.561 KNEE PAIN, RIGHT: ICD-10-CM

## 2025-07-22 PROCEDURE — 96372 THER/PROPH/DIAG INJ SC/IM: CPT | Performed by: EMERGENCY MEDICINE

## 2025-07-22 PROCEDURE — 99284 EMERGENCY DEPT VISIT MOD MDM: CPT | Mod: 25

## 2025-07-22 PROCEDURE — 63600175 PHARM REV CODE 636 W HCPCS: Mod: JZ,TB | Performed by: EMERGENCY MEDICINE

## 2025-07-22 RX ORDER — IBUPROFEN 800 MG/1
800 TABLET, FILM COATED ORAL EVERY 8 HOURS PRN
Qty: 20 TABLET | Refills: 0 | Status: SHIPPED | OUTPATIENT
Start: 2025-07-22

## 2025-07-22 RX ORDER — KETOROLAC TROMETHAMINE 30 MG/ML
30 INJECTION, SOLUTION INTRAMUSCULAR; INTRAVENOUS
Status: COMPLETED | OUTPATIENT
Start: 2025-07-22 | End: 2025-07-22

## 2025-07-22 RX ADMIN — KETOROLAC TROMETHAMINE 30 MG: 30 INJECTION, SOLUTION INTRAMUSCULAR; INTRAVENOUS at 09:07
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
## 2025-07-28 ENCOUNTER — OFFICE VISIT (OUTPATIENT)
Dept: ORTHOPEDICS | Facility: CLINIC | Age: 60
End: 2025-07-28
Payer: COMMERCIAL

## 2025-07-28 VITALS
WEIGHT: 157.81 LBS | HEART RATE: 85 BPM | BODY MASS INDEX: 29.04 KG/M2 | HEIGHT: 62 IN | DIASTOLIC BLOOD PRESSURE: 89 MMHG | SYSTOLIC BLOOD PRESSURE: 142 MMHG

## 2025-07-28 DIAGNOSIS — M25.561 CHRONIC PAIN OF RIGHT KNEE: ICD-10-CM

## 2025-07-28 DIAGNOSIS — G89.29 CHRONIC PAIN OF RIGHT KNEE: ICD-10-CM

## 2025-07-28 DIAGNOSIS — M17.11 PRIMARY OSTEOARTHRITIS OF RIGHT KNEE: Primary | ICD-10-CM

## 2025-07-28 PROCEDURE — 3077F SYST BP >= 140 MM HG: CPT | Mod: CPTII,,, | Performed by: NURSE PRACTITIONER

## 2025-07-28 PROCEDURE — 1159F MED LIST DOCD IN RCRD: CPT | Mod: CPTII,,, | Performed by: NURSE PRACTITIONER

## 2025-07-28 PROCEDURE — 3079F DIAST BP 80-89 MM HG: CPT | Mod: CPTII,,, | Performed by: NURSE PRACTITIONER

## 2025-07-28 PROCEDURE — 3008F BODY MASS INDEX DOCD: CPT | Mod: CPTII,,, | Performed by: NURSE PRACTITIONER

## 2025-07-28 PROCEDURE — 99213 OFFICE O/P EST LOW 20 MIN: CPT | Mod: ,,, | Performed by: NURSE PRACTITIONER

## 2025-07-28 RX ORDER — VIT C/E/ZN/COPPR/LUTEIN/ZEAXAN 250MG-90MG
500 CAPSULE ORAL DAILY
COMMUNITY

## 2025-07-28 RX ORDER — ACETAMINOPHEN 500 MG
5000 TABLET ORAL DAILY
COMMUNITY

## 2025-07-28 RX ORDER — GUAIFENESIN AND PHENYLEPHRINE HCL 400; 10 MG/1; MG/1
TABLET ORAL
COMMUNITY

## 2025-07-28 RX ORDER — HYDROCORTISONE ACETATE 0.5 %
CREAM (GRAM) TOPICAL
COMMUNITY

## 2025-07-28 RX ORDER — IBUPROFEN 100 MG/5ML
1000 SUSPENSION, ORAL (FINAL DOSE FORM) ORAL DAILY
COMMUNITY

## 2025-07-28 RX ORDER — GARLIC 1000 MG
CAPSULE ORAL
COMMUNITY
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
## 2025-08-22 ENCOUNTER — TELEPHONE (OUTPATIENT)
Dept: FAMILY MEDICINE | Facility: CLINIC | Age: 60
End: 2025-08-22
Payer: COMMERCIAL

## 2025-08-22 ENCOUNTER — LAB VISIT (OUTPATIENT)
Dept: LAB | Facility: HOSPITAL | Age: 60
End: 2025-08-22
Attending: FAMILY MEDICINE
Payer: COMMERCIAL

## 2025-08-22 DIAGNOSIS — Z00.00 WELLNESS EXAMINATION: ICD-10-CM

## 2025-08-22 LAB
25(OH)D3+25(OH)D2 SERPL-MCNC: 79 NG/ML (ref 30–80)
ALBUMIN SERPL-MCNC: 3.7 G/DL (ref 3.4–4.8)
ALBUMIN/GLOB SERPL: 1.3 RATIO (ref 1.1–2)
ALP SERPL-CCNC: 118 UNIT/L (ref 40–150)
ALT SERPL-CCNC: 20 UNIT/L (ref 0–55)
ANION GAP SERPL CALC-SCNC: 6 MEQ/L
AST SERPL-CCNC: 25 UNIT/L (ref 11–45)
BACTERIA #/AREA URNS AUTO: ABNORMAL /HPF
BASOPHILS # BLD AUTO: 0.03 X10(3)/MCL
BASOPHILS NFR BLD AUTO: 0.4 %
BILIRUB SERPL-MCNC: 0.4 MG/DL
BILIRUB UR QL STRIP.AUTO: NEGATIVE
BUN SERPL-MCNC: 13.9 MG/DL (ref 9.8–20.1)
CALCIUM SERPL-MCNC: 8.7 MG/DL (ref 8.4–10.2)
CHLORIDE SERPL-SCNC: 110 MMOL/L (ref 98–107)
CHOLEST SERPL-MCNC: 204 MG/DL
CHOLEST/HDLC SERPL: 3 {RATIO} (ref 0–5)
CLARITY UR: CLEAR
CO2 SERPL-SCNC: 28 MMOL/L (ref 23–31)
COLOR UR AUTO: COLORLESS
CREAT SERPL-MCNC: 0.87 MG/DL (ref 0.55–1.02)
CREAT/UREA NIT SERPL: 16
EOSINOPHIL # BLD AUTO: 0.1 X10(3)/MCL (ref 0–0.9)
EOSINOPHIL NFR BLD AUTO: 1.5 %
ERYTHROCYTE [DISTWIDTH] IN BLOOD BY AUTOMATED COUNT: 15 % (ref 11.5–17)
EST. AVERAGE GLUCOSE BLD GHB EST-MCNC: 119.8 MG/DL
GFR SERPLBLD CREATININE-BSD FMLA CKD-EPI: >60 ML/MIN/1.73/M2
GLOBULIN SER-MCNC: 2.8 GM/DL (ref 2.4–3.5)
GLUCOSE SERPL-MCNC: 101 MG/DL (ref 82–115)
GLUCOSE UR QL STRIP: NORMAL
HBA1C MFR BLD: 5.8 %
HCT VFR BLD AUTO: 39.4 % (ref 37–47)
HDLC SERPL-MCNC: 62 MG/DL (ref 35–60)
HGB BLD-MCNC: 12.6 G/DL (ref 12–16)
HGB UR QL STRIP: NEGATIVE
IMM GRANULOCYTES # BLD AUTO: 0.02 X10(3)/MCL (ref 0–0.04)
IMM GRANULOCYTES NFR BLD AUTO: 0.3 %
KETONES UR QL STRIP: NEGATIVE
LDLC SERPL CALC-MCNC: 130 MG/DL (ref 50–140)
LEUKOCYTE ESTERASE UR QL STRIP: NEGATIVE
LYMPHOCYTES # BLD AUTO: 2.75 X10(3)/MCL (ref 0.6–4.6)
LYMPHOCYTES NFR BLD AUTO: 41.2 %
MCH RBC QN AUTO: 27.3 PG (ref 27–31)
MCHC RBC AUTO-ENTMCNC: 32 G/DL (ref 33–36)
MCV RBC AUTO: 85.3 FL (ref 80–94)
MONOCYTES # BLD AUTO: 0.63 X10(3)/MCL (ref 0.1–1.3)
MONOCYTES NFR BLD AUTO: 9.4 %
MUCOUS THREADS URNS QL MICRO: ABNORMAL /LPF
NEUTROPHILS # BLD AUTO: 3.15 X10(3)/MCL (ref 2.1–9.2)
NEUTROPHILS NFR BLD AUTO: 47.2 %
NITRITE UR QL STRIP: NEGATIVE
NRBC BLD AUTO-RTO: 0 %
PH UR STRIP: 5 [PH]
PLATELET # BLD AUTO: 203 X10(3)/MCL (ref 130–400)
PMV BLD AUTO: 10.3 FL (ref 7.4–10.4)
POTASSIUM SERPL-SCNC: 4.2 MMOL/L (ref 3.5–5.1)
PROT SERPL-MCNC: 6.5 GM/DL (ref 5.8–7.6)
PROT UR QL STRIP: NEGATIVE
RBC # BLD AUTO: 4.62 X10(6)/MCL (ref 4.2–5.4)
RBC #/AREA URNS AUTO: ABNORMAL /HPF
SODIUM SERPL-SCNC: 144 MMOL/L (ref 136–145)
SP GR UR STRIP.AUTO: 1.01 (ref 1–1.03)
SQUAMOUS #/AREA URNS LPF: ABNORMAL /HPF
TRIGL SERPL-MCNC: 58 MG/DL (ref 37–140)
TSH SERPL-ACNC: 2.53 UIU/ML (ref 0.35–4.94)
UROBILINOGEN UR STRIP-ACNC: NORMAL
VLDLC SERPL CALC-MCNC: 12 MG/DL
WBC # BLD AUTO: 6.68 X10(3)/MCL (ref 4.5–11.5)
WBC #/AREA URNS AUTO: ABNORMAL /HPF

## 2025-08-22 PROCEDURE — 80053 COMPREHEN METABOLIC PANEL: CPT

## 2025-08-22 PROCEDURE — 82306 VITAMIN D 25 HYDROXY: CPT

## 2025-08-22 PROCEDURE — 83036 HEMOGLOBIN GLYCOSYLATED A1C: CPT

## 2025-08-22 PROCEDURE — 80061 LIPID PANEL: CPT

## 2025-08-22 PROCEDURE — 36415 COLL VENOUS BLD VENIPUNCTURE: CPT

## 2025-08-22 PROCEDURE — 85025 COMPLETE CBC W/AUTO DIFF WBC: CPT

## 2025-08-22 PROCEDURE — 81001 URINALYSIS AUTO W/SCOPE: CPT

## 2025-08-22 PROCEDURE — 84443 ASSAY THYROID STIM HORMONE: CPT

## 2025-08-28 ENCOUNTER — OFFICE VISIT (OUTPATIENT)
Dept: FAMILY MEDICINE | Facility: CLINIC | Age: 60
End: 2025-08-28
Payer: COMMERCIAL

## 2025-08-28 VITALS
RESPIRATION RATE: 16 BRPM | SYSTOLIC BLOOD PRESSURE: 137 MMHG | HEIGHT: 62 IN | WEIGHT: 159 LBS | DIASTOLIC BLOOD PRESSURE: 83 MMHG | TEMPERATURE: 98 F | BODY MASS INDEX: 29.26 KG/M2 | HEART RATE: 62 BPM | OXYGEN SATURATION: 99 %

## 2025-08-28 DIAGNOSIS — Z13.6 ENCOUNTER FOR SCREENING FOR CARDIOVASCULAR DISORDERS: ICD-10-CM

## 2025-08-28 DIAGNOSIS — R73.03 PREDIABETES: ICD-10-CM

## 2025-08-28 DIAGNOSIS — G89.29 CHRONIC LEFT-SIDED LOW BACK PAIN WITH LEFT-SIDED SCIATICA: ICD-10-CM

## 2025-08-28 DIAGNOSIS — Z00.00 WELLNESS EXAMINATION: Primary | ICD-10-CM

## 2025-08-28 DIAGNOSIS — M54.42 CHRONIC LEFT-SIDED LOW BACK PAIN WITH LEFT-SIDED SCIATICA: ICD-10-CM

## 2025-08-28 RX ORDER — GABAPENTIN 100 MG/1
100 CAPSULE ORAL 3 TIMES DAILY PRN
Qty: 90 CAPSULE | Refills: 2 | Status: SHIPPED | OUTPATIENT
Start: 2025-08-28 | End: 2025-11-26

## 2025-08-28 RX ORDER — DICLOFENAC SODIUM 75 MG/1
75 TABLET, DELAYED RELEASE ORAL 2 TIMES DAILY PRN
Qty: 60 TABLET | Refills: 2 | Status: SHIPPED | OUTPATIENT
Start: 2025-08-28